# Patient Record
Sex: MALE | Race: WHITE | Employment: UNEMPLOYED | URBAN - METROPOLITAN AREA
[De-identification: names, ages, dates, MRNs, and addresses within clinical notes are randomized per-mention and may not be internally consistent; named-entity substitution may affect disease eponyms.]

---

## 2023-12-29 DIAGNOSIS — F33.42 RECURRENT MAJOR DEPRESSIVE DISORDER, IN FULL REMISSION (HCC): ICD-10-CM

## 2023-12-29 DIAGNOSIS — Z12.5 SCREENING FOR PROSTATE CANCER: ICD-10-CM

## 2023-12-29 DIAGNOSIS — R79.89 LOW TESTOSTERONE: ICD-10-CM

## 2023-12-29 DIAGNOSIS — I10 PRIMARY HYPERTENSION: ICD-10-CM

## 2023-12-29 DIAGNOSIS — Z13.220 ENCOUNTER FOR SCREENING FOR LIPOID DISORDERS: ICD-10-CM

## 2023-12-29 LAB
ABSOLUTE IMMATURE GRANULOCYTE: <0.03 K/UL (ref 0–0.58)
ALBUMIN SERPL-MCNC: 4.3 G/DL (ref 3.5–5.2)
ALP BLD-CCNC: 50 U/L (ref 40–129)
ALT SERPL-CCNC: 19 U/L (ref 0–40)
ANION GAP SERPL CALCULATED.3IONS-SCNC: 12 MMOL/L (ref 7–16)
AST SERPL-CCNC: 23 U/L (ref 0–39)
BASOPHILS ABSOLUTE: 0.07 K/UL (ref 0–0.2)
BASOPHILS RELATIVE PERCENT: 1 % (ref 0–2)
BILIRUB SERPL-MCNC: 0.9 MG/DL (ref 0–1.2)
BUN BLDV-MCNC: 22 MG/DL (ref 6–20)
CALCIUM SERPL-MCNC: 9.6 MG/DL (ref 8.6–10.2)
CHLORIDE BLD-SCNC: 102 MMOL/L (ref 98–107)
CHOLESTEROL, FASTING: 159 MG/DL
CO2: 23 MMOL/L (ref 22–29)
CREAT SERPL-MCNC: 1.2 MG/DL (ref 0.7–1.2)
EOSINOPHILS ABSOLUTE: 0.26 K/UL (ref 0.05–0.5)
EOSINOPHILS RELATIVE PERCENT: 4 % (ref 0–6)
GFR SERPL CREATININE-BSD FRML MDRD: >60 ML/MIN/1.73M2
GLUCOSE BLD-MCNC: 85 MG/DL (ref 74–99)
HCT VFR BLD CALC: 44.2 % (ref 37–54)
HDLC SERPL-MCNC: 43 MG/DL
HEMOGLOBIN: 14.8 G/DL (ref 12.5–16.5)
IMMATURE GRANULOCYTES: 0 % (ref 0–5)
LDL CHOLESTEROL: 98 MG/DL
LYMPHOCYTES ABSOLUTE: 2.8 K/UL (ref 1.5–4)
LYMPHOCYTES RELATIVE PERCENT: 46 % (ref 20–42)
MCH RBC QN AUTO: 34.3 PG (ref 26–35)
MCHC RBC AUTO-ENTMCNC: 33.5 G/DL (ref 32–34.5)
MCV RBC AUTO: 102.3 FL (ref 80–99.9)
MONOCYTES ABSOLUTE: 0.53 K/UL (ref 0.1–0.95)
MONOCYTES RELATIVE PERCENT: 9 % (ref 2–12)
NEUTROPHILS ABSOLUTE: 2.37 K/UL (ref 1.8–7.3)
NEUTROPHILS RELATIVE PERCENT: 39 % (ref 43–80)
PDW BLD-RTO: 11.8 % (ref 11.5–15)
PLATELET # BLD: 363 K/UL (ref 130–450)
PMV BLD AUTO: 10.3 FL (ref 7–12)
POTASSIUM SERPL-SCNC: 4.7 MMOL/L (ref 3.5–5)
PROSTATE SPECIFIC ANTIGEN: 3.21 NG/ML (ref 0–4)
RBC # BLD: 4.32 M/UL (ref 3.8–5.8)
SODIUM BLD-SCNC: 137 MMOL/L (ref 132–146)
TOTAL PROTEIN: 7.1 G/DL (ref 6.4–8.3)
TRIGLYCERIDE, FASTING: 92 MG/DL
TSH SERPL DL<=0.05 MIU/L-ACNC: 1.96 UIU/ML (ref 0.27–4.2)
VLDLC SERPL CALC-MCNC: 18 MG/DL
WBC # BLD: 6.1 K/UL (ref 4.5–11.5)

## 2023-12-30 LAB
FOLLICLE STIMULATING HORMONE: 5.6 MIU/ML
LH: 6.4 MIU/ML

## 2024-01-03 LAB
SEX HORMONE BINDING GLOBULIN: 50 NMOL/L (ref 11–80)
TESTOSTERONE FREE-NONMALE: 66.5 PG/ML (ref 47–244)
TESTOSTERONE TOTAL: 426 NG/DL (ref 220–1000)
TESTOSTERONE, BIOAVAILABLE: 155.8 NG/DL (ref 130–680)

## 2024-01-04 ENCOUNTER — TELEPHONE (OUTPATIENT)
Dept: FAMILY MEDICINE CLINIC | Age: 59
End: 2024-01-04

## 2024-01-04 DIAGNOSIS — D75.89 MACROCYTOSIS: Primary | ICD-10-CM

## 2024-01-04 NOTE — TELEPHONE ENCOUNTER
----- Message from Tyree Ellison DO sent at 1/3/2024  4:26 PM EST -----  Labs normal other than macrocytosis    Needs B12 and folate levels Dx macrocytosis (anne up please and thanks)    If patient is drinking alcohol, stop, it is starting to manifest as derangements on lab work (doing bad things in the blood and to organs).

## 2024-01-12 DIAGNOSIS — D75.89 MACROCYTOSIS: ICD-10-CM

## 2024-01-12 LAB
FOLATE: 7.4 NG/ML (ref 4.8–24.2)
VITAMIN B-12: 455 PG/ML (ref 211–946)

## 2024-01-16 ENCOUNTER — TELEPHONE (OUTPATIENT)
Dept: FAMILY MEDICINE CLINIC | Age: 59
End: 2024-01-16

## 2024-01-16 DIAGNOSIS — R71.8 RED BLOOD CELL ABNORMALITY: Primary | ICD-10-CM

## 2024-01-16 DIAGNOSIS — D75.89 MACROCYTOSIS: ICD-10-CM

## 2024-01-16 NOTE — TELEPHONE ENCOUNTER
----- Message from Tyree Ellison DO sent at 1/15/2024 10:19 AM EST -----  Can we confirm if any current alcohol use?  The b12 and folate are normal.  If none, then we can have a pathologist look under the microscope and a reticulocyte count for other potential causes.   (Dx macrocytosis, red blood cell abnormality)

## 2024-01-16 NOTE — TELEPHONE ENCOUNTER
Patient notified   Per patient, no current alcohol use.    I called [facility], then transferred to pathology.   Spoke with Dr Marquez.   Per Dr Marquez order peripheral blood smear review and another CBC with Diff. A report will be sent with dictation by pathologist.     Reticulocytes pended  Peripheral Smear, CBC w/Diff pended if need instead

## 2024-03-07 ENCOUNTER — TELEPHONE (OUTPATIENT)
Dept: FAMILY MEDICINE CLINIC | Age: 59
End: 2024-03-07

## 2024-03-07 DIAGNOSIS — R62.7 FAILURE TO THRIVE IN ADULT: ICD-10-CM

## 2024-03-07 DIAGNOSIS — C16.9 ADENOCARCINOMA OF STOMACH (HCC): Primary | ICD-10-CM

## 2024-03-07 DIAGNOSIS — Z51.5 END OF LIFE CARE: ICD-10-CM

## 2024-03-07 NOTE — TELEPHONE ENCOUNTER
Opal-pt cousin called in to report that pt has rapidly declined in the last several weeks. Pt spends his time \"writhing in pain and drinking alcohol\". Pt has current diagnosis of adenocarcinoma of the stomach and is refusing any treatment, pt does not see oncology for condition.     Opal requesting an appt to be evaluated to order/adjust palliative care. Pt does not have Home Health visiting and is requesting end of life care or evaluation.     Soonest available appt is 07/2024.

## 2024-03-11 ENCOUNTER — HOSPITAL ENCOUNTER (INPATIENT)
Age: 59
LOS: 2 days | Discharge: HOME | DRG: 469 | End: 2024-03-13
Attending: EMERGENCY MEDICINE | Admitting: INTERNAL MEDICINE
Payer: MEDICAID

## 2024-03-11 ENCOUNTER — APPOINTMENT (OUTPATIENT)
Dept: GENERAL RADIOLOGY | Age: 59
DRG: 469 | End: 2024-03-11
Payer: MEDICAID

## 2024-03-11 ENCOUNTER — TELEPHONE (OUTPATIENT)
Dept: PALLATIVE CARE | Age: 59
End: 2024-03-11

## 2024-03-11 DIAGNOSIS — N17.9 AKI (ACUTE KIDNEY INJURY) (HCC): ICD-10-CM

## 2024-03-11 DIAGNOSIS — F10.10 ALCOHOL ABUSE: ICD-10-CM

## 2024-03-11 DIAGNOSIS — R55 SYNCOPE AND COLLAPSE: Primary | ICD-10-CM

## 2024-03-11 LAB
ALBUMIN SERPL-MCNC: 4.1 G/DL (ref 3.5–5.2)
ALP SERPL-CCNC: 74 U/L (ref 40–129)
ALT SERPL-CCNC: 79 U/L (ref 0–40)
ANION GAP SERPL CALCULATED.3IONS-SCNC: 18 MMOL/L (ref 7–16)
AST SERPL-CCNC: 80 U/L (ref 0–39)
BASOPHILS # BLD: 0.03 K/UL (ref 0–0.2)
BASOPHILS NFR BLD: 0 % (ref 0–2)
BILIRUB SERPL-MCNC: 0.3 MG/DL (ref 0–1.2)
BUN SERPL-MCNC: 49 MG/DL (ref 6–20)
CALCIUM SERPL-MCNC: 8.5 MG/DL (ref 8.6–10.2)
CHLORIDE SERPL-SCNC: 104 MMOL/L (ref 98–107)
CO2 SERPL-SCNC: 20 MMOL/L (ref 22–29)
CREAT SERPL-MCNC: 2 MG/DL (ref 0.7–1.2)
EOSINOPHIL # BLD: 0.1 K/UL (ref 0.05–0.5)
EOSINOPHILS RELATIVE PERCENT: 1 % (ref 0–6)
ERYTHROCYTE [DISTWIDTH] IN BLOOD BY AUTOMATED COUNT: 16.3 % (ref 11.5–15)
GFR SERPL CREATININE-BSD FRML MDRD: 38 ML/MIN/1.73M2
GLUCOSE SERPL-MCNC: 107 MG/DL (ref 74–99)
HCT VFR BLD AUTO: 38.3 % (ref 37–54)
HGB BLD-MCNC: 13.1 G/DL (ref 12.5–16.5)
IMM GRANULOCYTES # BLD AUTO: 0.05 K/UL (ref 0–0.58)
IMM GRANULOCYTES NFR BLD: 1 % (ref 0–5)
INR PPP: 0.9
LACTATE BLDV-SCNC: 2.6 MMOL/L (ref 0.5–2.2)
LIPASE SERPL-CCNC: 126 U/L (ref 13–60)
LYMPHOCYTES NFR BLD: 1.63 K/UL (ref 1.5–4)
LYMPHOCYTES RELATIVE PERCENT: 22 % (ref 20–42)
MAGNESIUM SERPL-MCNC: 2.1 MG/DL (ref 1.6–2.6)
MCH RBC QN AUTO: 34 PG (ref 26–35)
MCHC RBC AUTO-ENTMCNC: 34.2 G/DL (ref 32–34.5)
MCV RBC AUTO: 99.5 FL (ref 80–99.9)
MONOCYTES NFR BLD: 0.73 K/UL (ref 0.1–0.95)
MONOCYTES NFR BLD: 10 % (ref 2–12)
NEUTROPHILS NFR BLD: 66 % (ref 43–80)
NEUTS SEG NFR BLD: 4.97 K/UL (ref 1.8–7.3)
PLATELET # BLD AUTO: 209 K/UL (ref 130–450)
PMV BLD AUTO: 9.2 FL (ref 7–12)
POTASSIUM SERPL-SCNC: 3.6 MMOL/L (ref 3.5–5)
PROT SERPL-MCNC: 6.5 G/DL (ref 6.4–8.3)
PROTHROMBIN TIME: 10 SEC (ref 9.3–12.4)
RBC # BLD AUTO: 3.85 M/UL (ref 3.8–5.8)
SODIUM SERPL-SCNC: 142 MMOL/L (ref 132–146)
TROPONIN I SERPL HS-MCNC: 12 NG/L (ref 0–11)
WBC OTHER # BLD: 7.5 K/UL (ref 4.5–11.5)

## 2024-03-11 PROCEDURE — 85025 COMPLETE CBC W/AUTO DIFF WBC: CPT

## 2024-03-11 PROCEDURE — 71045 X-RAY EXAM CHEST 1 VIEW: CPT

## 2024-03-11 PROCEDURE — 85610 PROTHROMBIN TIME: CPT

## 2024-03-11 PROCEDURE — 83690 ASSAY OF LIPASE: CPT

## 2024-03-11 PROCEDURE — 84484 ASSAY OF TROPONIN QUANT: CPT

## 2024-03-11 PROCEDURE — 83605 ASSAY OF LACTIC ACID: CPT

## 2024-03-11 PROCEDURE — 80053 COMPREHEN METABOLIC PANEL: CPT

## 2024-03-11 PROCEDURE — 6370000000 HC RX 637 (ALT 250 FOR IP)

## 2024-03-11 PROCEDURE — 96360 HYDRATION IV INFUSION INIT: CPT

## 2024-03-11 PROCEDURE — 1200000000 HC SEMI PRIVATE

## 2024-03-11 PROCEDURE — 93005 ELECTROCARDIOGRAM TRACING: CPT

## 2024-03-11 PROCEDURE — 99285 EMERGENCY DEPT VISIT HI MDM: CPT

## 2024-03-11 PROCEDURE — 83735 ASSAY OF MAGNESIUM: CPT

## 2024-03-11 PROCEDURE — 2580000003 HC RX 258

## 2024-03-11 PROCEDURE — 96361 HYDRATE IV INFUSION ADD-ON: CPT

## 2024-03-11 RX ORDER — ENOXAPARIN SODIUM 100 MG/ML
40 INJECTION SUBCUTANEOUS DAILY
Status: DISCONTINUED | OUTPATIENT
Start: 2024-03-12 | End: 2024-03-13 | Stop reason: HOSPADM

## 2024-03-11 RX ORDER — GLUCAGON 1 MG/ML
1 KIT INJECTION PRN
Status: DISCONTINUED | OUTPATIENT
Start: 2024-03-11 | End: 2024-03-12

## 2024-03-11 RX ORDER — LORAZEPAM 1 MG/1
3 TABLET ORAL
Status: DISCONTINUED | OUTPATIENT
Start: 2024-03-11 | End: 2024-03-11

## 2024-03-11 RX ORDER — SODIUM CHLORIDE 0.9 % (FLUSH) 0.9 %
5-40 SYRINGE (ML) INJECTION PRN
Status: DISCONTINUED | OUTPATIENT
Start: 2024-03-11 | End: 2024-03-13 | Stop reason: HOSPADM

## 2024-03-11 RX ORDER — LORAZEPAM 1 MG/1
1 TABLET ORAL
Status: DISCONTINUED | OUTPATIENT
Start: 2024-03-11 | End: 2024-03-11

## 2024-03-11 RX ORDER — POLYETHYLENE GLYCOL 3350 17 G/17G
17 POWDER, FOR SOLUTION ORAL DAILY PRN
Status: DISCONTINUED | OUTPATIENT
Start: 2024-03-11 | End: 2024-03-12

## 2024-03-11 RX ORDER — SODIUM CHLORIDE 9 MG/ML
INJECTION, SOLUTION INTRAVENOUS PRN
Status: DISCONTINUED | OUTPATIENT
Start: 2024-03-11 | End: 2024-03-11 | Stop reason: SDUPTHER

## 2024-03-11 RX ORDER — LORAZEPAM 1 MG/1
4 TABLET ORAL
Status: DISCONTINUED | OUTPATIENT
Start: 2024-03-11 | End: 2024-03-11

## 2024-03-11 RX ORDER — ACETAMINOPHEN 650 MG/1
650 SUPPOSITORY RECTAL EVERY 6 HOURS PRN
Status: DISCONTINUED | OUTPATIENT
Start: 2024-03-11 | End: 2024-03-13 | Stop reason: HOSPADM

## 2024-03-11 RX ORDER — M-VIT,TX,IRON,MINS/CALC/FOLIC 27MG-0.4MG
1 TABLET ORAL DAILY
Status: DISCONTINUED | OUTPATIENT
Start: 2024-03-11 | End: 2024-03-13 | Stop reason: HOSPADM

## 2024-03-11 RX ORDER — LORAZEPAM 2 MG/ML
3 INJECTION INTRAMUSCULAR
Status: DISCONTINUED | OUTPATIENT
Start: 2024-03-11 | End: 2024-03-11

## 2024-03-11 RX ORDER — LORAZEPAM 2 MG/ML
2 INJECTION INTRAMUSCULAR
Status: DISCONTINUED | OUTPATIENT
Start: 2024-03-11 | End: 2024-03-11

## 2024-03-11 RX ORDER — POTASSIUM CHLORIDE 7.45 MG/ML
10 INJECTION INTRAVENOUS PRN
Status: DISCONTINUED | OUTPATIENT
Start: 2024-03-11 | End: 2024-03-13 | Stop reason: HOSPADM

## 2024-03-11 RX ORDER — POTASSIUM CHLORIDE 20 MEQ/1
40 TABLET, EXTENDED RELEASE ORAL PRN
Status: DISCONTINUED | OUTPATIENT
Start: 2024-03-11 | End: 2024-03-13 | Stop reason: HOSPADM

## 2024-03-11 RX ORDER — 0.9 % SODIUM CHLORIDE 0.9 %
1000 INTRAVENOUS SOLUTION INTRAVENOUS ONCE
Status: COMPLETED | OUTPATIENT
Start: 2024-03-11 | End: 2024-03-11

## 2024-03-11 RX ORDER — 0.9 % SODIUM CHLORIDE 0.9 %
1000 INTRAVENOUS SOLUTION INTRAVENOUS ONCE
Status: COMPLETED | OUTPATIENT
Start: 2024-03-11 | End: 2024-03-12

## 2024-03-11 RX ORDER — ACETAMINOPHEN 325 MG/1
650 TABLET ORAL EVERY 6 HOURS PRN
Status: DISCONTINUED | OUTPATIENT
Start: 2024-03-11 | End: 2024-03-13 | Stop reason: HOSPADM

## 2024-03-11 RX ORDER — MAGNESIUM SULFATE IN WATER 40 MG/ML
2000 INJECTION, SOLUTION INTRAVENOUS PRN
Status: DISCONTINUED | OUTPATIENT
Start: 2024-03-11 | End: 2024-03-13 | Stop reason: HOSPADM

## 2024-03-11 RX ORDER — SODIUM CHLORIDE 0.9 % (FLUSH) 0.9 %
5-40 SYRINGE (ML) INJECTION EVERY 12 HOURS SCHEDULED
Status: DISCONTINUED | OUTPATIENT
Start: 2024-03-11 | End: 2024-03-13 | Stop reason: HOSPADM

## 2024-03-11 RX ORDER — GAUZE BANDAGE 2" X 2"
100 BANDAGE TOPICAL DAILY
Status: DISCONTINUED | OUTPATIENT
Start: 2024-03-11 | End: 2024-03-13 | Stop reason: HOSPADM

## 2024-03-11 RX ORDER — DEXTROSE MONOHYDRATE 100 MG/ML
INJECTION, SOLUTION INTRAVENOUS CONTINUOUS PRN
Status: DISCONTINUED | OUTPATIENT
Start: 2024-03-11 | End: 2024-03-12

## 2024-03-11 RX ORDER — SODIUM CHLORIDE 9 MG/ML
INJECTION, SOLUTION INTRAVENOUS PRN
Status: DISCONTINUED | OUTPATIENT
Start: 2024-03-11 | End: 2024-03-13 | Stop reason: HOSPADM

## 2024-03-11 RX ORDER — LORAZEPAM 2 MG/ML
4 INJECTION INTRAMUSCULAR
Status: DISCONTINUED | OUTPATIENT
Start: 2024-03-11 | End: 2024-03-11

## 2024-03-11 RX ORDER — FOLIC ACID 1 MG/1
1 TABLET ORAL DAILY
Status: DISCONTINUED | OUTPATIENT
Start: 2024-03-11 | End: 2024-03-13 | Stop reason: HOSPADM

## 2024-03-11 RX ORDER — LORAZEPAM 2 MG/ML
1 INJECTION INTRAMUSCULAR
Status: DISCONTINUED | OUTPATIENT
Start: 2024-03-11 | End: 2024-03-11

## 2024-03-11 RX ORDER — LORAZEPAM 1 MG/1
2 TABLET ORAL
Status: DISCONTINUED | OUTPATIENT
Start: 2024-03-11 | End: 2024-03-11

## 2024-03-11 RX ADMIN — Medication 1 TABLET: at 20:23

## 2024-03-11 RX ADMIN — SODIUM CHLORIDE 1000 ML: 9 INJECTION, SOLUTION INTRAVENOUS at 20:27

## 2024-03-11 RX ADMIN — Medication 100 MG: at 20:23

## 2024-03-11 RX ADMIN — FOLIC ACID 1 MG: 1 TABLET ORAL at 20:23

## 2024-03-11 ASSESSMENT — LIFESTYLE VARIABLES
HOW MANY STANDARD DRINKS CONTAINING ALCOHOL DO YOU HAVE ON A TYPICAL DAY: 1 OR 2
HOW OFTEN DO YOU HAVE A DRINK CONTAINING ALCOHOL: 2-4 TIMES A MONTH

## 2024-03-11 NOTE — TELEPHONE ENCOUNTER
Called to speak with Opal about referral for Palliative Care for Kirk. Opal appreciated call but does not need this referral any longer, Kirk does not want this service.

## 2024-03-11 NOTE — ED PROVIDER NOTES
Department of Emergency Medicine     Written by: Shen Estes DO  Patient Name: Jesus Briscoe  Admit Date: 3/11/2024  7:10 PM  MRN: [id number]                   : 1965      HPI  Chief Complaint   Patient presents with    Loss of Consciousness     2 min of loc, denies pain from falling, unknown if hit head, complaining of headache, pt is attempting to detox himself off alcohol. 70/30 BP for ems on scene     This is a 58-year-old male with past medical history of hypertension and alcohol abuse who presents to the emergency department today with a syncopal episode.  [place] he states his head got \"fuzzy\" and he passed out.  He states he has full recollection of the entire event and that his \"body passed out but not his brain\".  Patient is currently attempting to detox himself off of alcohol.  Today, he was sipping on bourbon while this event happened.  He states he feels at his baseline at this time.  Per EMS, when they arrived on scene, patient's blood pressure 70/30.  Patient states he only has high blood pressure and has never had blood pressure that low before.  Patient denies any lightheadedness or dizziness, fever or chills, nausea or vomiting, chest pain, shortness of breath, abdominal pain, hematuria or dysuria, constipation or diarrhea.    Nursing notes were all reviewed and agreed with or any disagreements were addressed in the HPI.    Review of systems:    Pertinent positives and negatives mentioned in the HPI/MDM.     Physical Exam  Vitals and nursing note reviewed.   Constitutional:       General: He is not in acute distress.  HENT:      Head: Normocephalic and atraumatic.   Eyes:      Extraocular Movements: Extraocular movements intact.      Pupils: Pupils are equal, round, and reactive to light.   Cardiovascular:      Rate and Rhythm: Normal rate and regular rhythm.   Pulmonary:      Effort: Pulmonary effort is normal.      Breath sounds: Normal

## 2024-03-12 ENCOUNTER — APPOINTMENT (OUTPATIENT)
Dept: CT IMAGING | Age: 59
DRG: 469 | End: 2024-03-12
Payer: MEDICAID

## 2024-03-12 ENCOUNTER — APPOINTMENT (OUTPATIENT)
Age: 59
DRG: 469 | End: 2024-03-12
Attending: INTERNAL MEDICINE
Payer: MEDICAID

## 2024-03-12 LAB
ABO + RH BLD: NORMAL
ALBUMIN SERPL-MCNC: 3.8 G/DL (ref 3.5–5.2)
ALP SERPL-CCNC: 61 U/L (ref 40–129)
ALT SERPL-CCNC: 83 U/L (ref 0–40)
ANION GAP SERPL CALCULATED.3IONS-SCNC: 12 MMOL/L (ref 7–16)
ARM BAND NUMBER: NORMAL
AST SERPL-CCNC: 93 U/L (ref 0–39)
BASOPHILS # BLD: 0.04 K/UL (ref 0–0.2)
BASOPHILS NFR BLD: 1 % (ref 0–2)
BILIRUB SERPL-MCNC: 0.6 MG/DL (ref 0–1.2)
BILIRUB UR QL STRIP: NEGATIVE
BLOOD BANK SAMPLE EXPIRATION: NORMAL
BLOOD GROUP ANTIBODIES SERPL: NEGATIVE
BNP SERPL-MCNC: 62 PG/ML (ref 0–450)
BUN SERPL-MCNC: 37 MG/DL (ref 6–20)
CALCIUM SERPL-MCNC: 8.6 MG/DL (ref 8.6–10.2)
CHLORIDE SERPL-SCNC: 109 MMOL/L (ref 98–107)
CK SERPL-CCNC: 81 U/L (ref 20–200)
CLARITY UR: CLEAR
CO2 SERPL-SCNC: 20 MMOL/L (ref 22–29)
COLOR UR: YELLOW
CREAT SERPL-MCNC: 1 MG/DL (ref 0.7–1.2)
ECHO AV AREA PEAK VELOCITY: 3 CM2
ECHO AV AREA PEAK VELOCITY: 3.1 CM2
ECHO AV AREA PEAK VELOCITY: 3.2 CM2
ECHO AV AREA PEAK VELOCITY: 3.4 CM2
ECHO AV AREA VTI: 3.2 CM2
ECHO AV AREA/BSA VTI: 1.5 CM2/M2
ECHO AV CUSP MM: 2.3 CM
ECHO AV MEAN GRADIENT: 6 MMHG
ECHO AV MEAN VELOCITY: 1.2 M/S
ECHO AV PEAK GRADIENT: 10 MMHG
ECHO AV PEAK GRADIENT: 9 MMHG
ECHO AV PEAK VELOCITY: 1.5 M/S
ECHO AV PEAK VELOCITY: 1.6 M/S
ECHO AV VTI: 28.6 CM
ECHO BSA: 2.16 M2
ECHO EST RA PRESSURE: 3 MMHG
ECHO LA DIAMETER INDEX: 1.75 CM/M2
ECHO LA DIAMETER: 3.8 CM
ECHO LA VOL A-L A2C: 60 ML (ref 18–58)
ECHO LA VOL A-L A4C: 53 ML (ref 18–58)
ECHO LA VOL MOD A2C: 58 ML (ref 18–58)
ECHO LA VOL MOD A4C: 51 ML (ref 18–58)
ECHO LA VOLUME AREA LENGTH: 61 ML
ECHO LA VOLUME INDEX A-L A2C: 28 ML/M2 (ref 16–34)
ECHO LA VOLUME INDEX A-L A4C: 24 ML/M2 (ref 16–34)
ECHO LA VOLUME INDEX AREA LENGTH: 28 ML/M2 (ref 16–34)
ECHO LA VOLUME INDEX MOD A2C: 27 ML/M2 (ref 16–34)
ECHO LA VOLUME INDEX MOD A4C: 24 ML/M2 (ref 16–34)
ECHO LV EF PHYSICIAN: 60 %
ECHO LV FRACTIONAL SHORTENING: 36 % (ref 28–44)
ECHO LV INTERNAL DIMENSION DIASTOLE INDEX: 2.03 CM/M2
ECHO LV INTERNAL DIMENSION DIASTOLIC: 4.4 CM (ref 4.2–5.9)
ECHO LV INTERNAL DIMENSION SYSTOLIC INDEX: 1.29 CM/M2
ECHO LV INTERNAL DIMENSION SYSTOLIC: 2.8 CM
ECHO LV IVSD: 1.2 CM (ref 0.6–1)
ECHO LV IVSS: 1.3 CM
ECHO LV MASS 2D: 191.3 G (ref 88–224)
ECHO LV MASS INDEX 2D: 88.2 G/M2 (ref 49–115)
ECHO LV POSTERIOR WALL DIASTOLIC: 1.2 CM (ref 0.6–1)
ECHO LV POSTERIOR WALL SYSTOLIC: 1.5 CM
ECHO LV RELATIVE WALL THICKNESS RATIO: 0.55
ECHO LVOT AREA: 3.8 CM2
ECHO LVOT AV VTI INDEX: 0.86
ECHO LVOT DIAM: 2.2 CM
ECHO LVOT MEAN GRADIENT: 4 MMHG
ECHO LVOT PEAK GRADIENT: 6 MMHG
ECHO LVOT PEAK GRADIENT: 8 MMHG
ECHO LVOT PEAK VELOCITY: 1.3 M/S
ECHO LVOT PEAK VELOCITY: 1.4 M/S
ECHO LVOT STROKE VOLUME INDEX: 42.9 ML/M2
ECHO LVOT SV: 93.1 ML
ECHO LVOT VTI: 24.5 CM
ECHO MV "A" WAVE DURATION: 152.3 MSEC
ECHO MV A VELOCITY: 0.71 M/S
ECHO MV AREA PHT: 3.3 CM2
ECHO MV AREA VTI: 3.8 CM2
ECHO MV E DECELERATION TIME (DT): 222.5 MS
ECHO MV E VELOCITY: 0.89 M/S
ECHO MV E/A RATIO: 1.25
ECHO MV LVOT VTI INDEX: 1.01
ECHO MV MAX VELOCITY: 0.9 M/S
ECHO MV MEAN GRADIENT: 1 MMHG
ECHO MV MEAN VELOCITY: 0.6 M/S
ECHO MV PEAK GRADIENT: 3 MMHG
ECHO MV PRESSURE HALF TIME (PHT): 65.7 MS
ECHO MV VTI: 24.8 CM
ECHO PULMONARY ARTERY END DIASTOLIC PRESSURE: 3 MMHG
ECHO PV MAX VELOCITY: 1.1 M/S
ECHO PV MEAN GRADIENT: 3 MMHG
ECHO PV MEAN VELOCITY: 0.7 M/S
ECHO PV PEAK GRADIENT: 5 MMHG
ECHO PV REGURGITANT MAX VELOCITY: 0.9 M/S
ECHO PV VTI: 21.5 CM
ECHO PVEIN A DURATION: 161.5 MS
ECHO PVEIN A VELOCITY: 0.3 M/S
ECHO PVEIN PEAK D VELOCITY: 0.5 M/S
ECHO PVEIN PEAK S VELOCITY: 0.6 M/S
ECHO PVEIN S/D RATIO: 1.2
ECHO RIGHT VENTRICULAR SYSTOLIC PRESSURE (RVSP): 24 MMHG
ECHO RV INTERNAL DIMENSION: 3.2 CM
ECHO RV TAPSE: 2.2 CM (ref 1.7–?)
ECHO RVOT PEAK GRADIENT: 2 MMHG
ECHO RVOT PEAK VELOCITY: 0.6 M/S
ECHO TV REGURGITANT MAX VELOCITY: 2.29 M/S
ECHO TV REGURGITANT PEAK GRADIENT: 21 MMHG
EKG ATRIAL RATE: 90 BPM
EKG P AXIS: 76 DEGREES
EKG P-R INTERVAL: 176 MS
EKG Q-T INTERVAL: 372 MS
EKG QRS DURATION: 84 MS
EKG QTC CALCULATION (BAZETT): 455 MS
EKG R AXIS: -47 DEGREES
EKG T AXIS: 73 DEGREES
EKG VENTRICULAR RATE: 90 BPM
EOSINOPHIL # BLD: 0.19 K/UL (ref 0.05–0.5)
EOSINOPHILS RELATIVE PERCENT: 3 % (ref 0–6)
ERYTHROCYTE [DISTWIDTH] IN BLOOD BY AUTOMATED COUNT: 16.6 % (ref 11.5–15)
FOLATE SERPL-MCNC: 17.8 NG/ML (ref 4.8–24.2)
GFR SERPL CREATININE-BSD FRML MDRD: >60 ML/MIN/1.73M2
GLUCOSE BLD-MCNC: 92 MG/DL (ref 74–99)
GLUCOSE SERPL-MCNC: 83 MG/DL (ref 74–99)
GLUCOSE UR STRIP-MCNC: NEGATIVE MG/DL
HCT VFR BLD AUTO: 34.7 % (ref 37–54)
HGB BLD-MCNC: 11.9 G/DL (ref 12.5–16.5)
HGB UR QL STRIP.AUTO: NEGATIVE
IMM GRANULOCYTES # BLD AUTO: 0.04 K/UL (ref 0–0.58)
IMM GRANULOCYTES NFR BLD: 1 % (ref 0–5)
KETONES UR STRIP-MCNC: NEGATIVE MG/DL
LACTATE BLDV-SCNC: 0.9 MMOL/L (ref 0.5–2.2)
LEUKOCYTE ESTERASE UR QL STRIP: NEGATIVE
LIPASE SERPL-CCNC: 89 U/L (ref 13–60)
LYMPHOCYTES NFR BLD: 2.79 K/UL (ref 1.5–4)
LYMPHOCYTES RELATIVE PERCENT: 42 % (ref 20–42)
MAGNESIUM SERPL-MCNC: 2.1 MG/DL (ref 1.6–2.6)
MCH RBC QN AUTO: 34.2 PG (ref 26–35)
MCHC RBC AUTO-ENTMCNC: 34.3 G/DL (ref 32–34.5)
MCV RBC AUTO: 99.7 FL (ref 80–99.9)
MONOCYTES NFR BLD: 0.6 K/UL (ref 0.1–0.95)
MONOCYTES NFR BLD: 9 % (ref 2–12)
NEUTROPHILS NFR BLD: 45 % (ref 43–80)
NEUTS SEG NFR BLD: 2.93 K/UL (ref 1.8–7.3)
NITRITE UR QL STRIP: NEGATIVE
PH UR STRIP: 5.5 [PH] (ref 5–9)
PHOSPHATE SERPL-MCNC: 4.5 MG/DL (ref 2.5–4.5)
PLATELET # BLD AUTO: 195 K/UL (ref 130–450)
PMV BLD AUTO: 9.7 FL (ref 7–12)
POTASSIUM SERPL-SCNC: 3.3 MMOL/L (ref 3.5–5)
PROCALCITONIN SERPL-MCNC: 0.2 NG/ML (ref 0–0.08)
PROT SERPL-MCNC: 6.1 G/DL (ref 6.4–8.3)
PROT UR STRIP-MCNC: NEGATIVE MG/DL
RBC # BLD AUTO: 3.48 M/UL (ref 3.8–5.8)
RBC #/AREA URNS HPF: NORMAL /HPF
SODIUM SERPL-SCNC: 141 MMOL/L (ref 132–146)
SP GR UR STRIP: 1.01 (ref 1–1.03)
T4 FREE SERPL-MCNC: 1.1 NG/DL (ref 0.9–1.7)
TSH SERPL DL<=0.05 MIU/L-ACNC: 1.11 UIU/ML (ref 0.27–4.2)
UROBILINOGEN UR STRIP-ACNC: 0.2 EU/DL (ref 0–1)
VIT B12 SERPL-MCNC: 406 PG/ML (ref 211–946)
WBC #/AREA URNS HPF: NORMAL /HPF
WBC OTHER # BLD: 6.6 K/UL (ref 4.5–11.5)

## 2024-03-12 PROCEDURE — 6360000002 HC RX W HCPCS: Performed by: NURSE PRACTITIONER

## 2024-03-12 PROCEDURE — 83605 ASSAY OF LACTIC ACID: CPT

## 2024-03-12 PROCEDURE — 71260 CT THORAX DX C+: CPT

## 2024-03-12 PROCEDURE — 2580000003 HC RX 258: Performed by: INTERNAL MEDICINE

## 2024-03-12 PROCEDURE — 82607 VITAMIN B-12: CPT

## 2024-03-12 PROCEDURE — 82746 ASSAY OF FOLIC ACID SERUM: CPT

## 2024-03-12 PROCEDURE — 84443 ASSAY THYROID STIM HORMONE: CPT

## 2024-03-12 PROCEDURE — 82550 ASSAY OF CK (CPK): CPT

## 2024-03-12 PROCEDURE — 93306 TTE W/DOPPLER COMPLETE: CPT

## 2024-03-12 PROCEDURE — 93306 TTE W/DOPPLER COMPLETE: CPT | Performed by: INTERNAL MEDICINE

## 2024-03-12 PROCEDURE — 82962 GLUCOSE BLOOD TEST: CPT

## 2024-03-12 PROCEDURE — 36415 COLL VENOUS BLD VENIPUNCTURE: CPT

## 2024-03-12 PROCEDURE — 93010 ELECTROCARDIOGRAM REPORT: CPT | Performed by: INTERNAL MEDICINE

## 2024-03-12 PROCEDURE — 6360000004 HC RX CONTRAST MEDICATION: Performed by: RADIOLOGY

## 2024-03-12 PROCEDURE — 84439 ASSAY OF FREE THYROXINE: CPT

## 2024-03-12 PROCEDURE — 80053 COMPREHEN METABOLIC PANEL: CPT

## 2024-03-12 PROCEDURE — 86901 BLOOD TYPING SEROLOGIC RH(D): CPT

## 2024-03-12 PROCEDURE — 86850 RBC ANTIBODY SCREEN: CPT

## 2024-03-12 PROCEDURE — 6370000000 HC RX 637 (ALT 250 FOR IP): Performed by: NURSE PRACTITIONER

## 2024-03-12 PROCEDURE — 1200000000 HC SEMI PRIVATE

## 2024-03-12 PROCEDURE — 81001 URINALYSIS AUTO W/SCOPE: CPT

## 2024-03-12 PROCEDURE — 83735 ASSAY OF MAGNESIUM: CPT

## 2024-03-12 PROCEDURE — 6370000000 HC RX 637 (ALT 250 FOR IP)

## 2024-03-12 PROCEDURE — 6370000000 HC RX 637 (ALT 250 FOR IP): Performed by: INTERNAL MEDICINE

## 2024-03-12 PROCEDURE — 84145 PROCALCITONIN (PCT): CPT

## 2024-03-12 PROCEDURE — C9113 INJ PANTOPRAZOLE SODIUM, VIA: HCPCS | Performed by: NURSE PRACTITIONER

## 2024-03-12 PROCEDURE — 83690 ASSAY OF LIPASE: CPT

## 2024-03-12 PROCEDURE — 84100 ASSAY OF PHOSPHORUS: CPT

## 2024-03-12 PROCEDURE — 74177 CT ABD & PELVIS W/CONTRAST: CPT

## 2024-03-12 PROCEDURE — 86900 BLOOD TYPING SEROLOGIC ABO: CPT

## 2024-03-12 PROCEDURE — 83880 ASSAY OF NATRIURETIC PEPTIDE: CPT

## 2024-03-12 PROCEDURE — 74176 CT ABD & PELVIS W/O CONTRAST: CPT

## 2024-03-12 PROCEDURE — 85025 COMPLETE CBC W/AUTO DIFF WBC: CPT

## 2024-03-12 PROCEDURE — 2580000003 HC RX 258

## 2024-03-12 PROCEDURE — 6360000002 HC RX W HCPCS: Performed by: INTERNAL MEDICINE

## 2024-03-12 RX ORDER — SENNOSIDES A AND B 8.6 MG/1
2 TABLET, FILM COATED ORAL NIGHTLY
Status: DISCONTINUED | OUTPATIENT
Start: 2024-03-12 | End: 2024-03-13 | Stop reason: HOSPADM

## 2024-03-12 RX ORDER — PANTOPRAZOLE SODIUM 40 MG/10ML
40 INJECTION, POWDER, LYOPHILIZED, FOR SOLUTION INTRAVENOUS 2 TIMES DAILY
Status: DISCONTINUED | OUTPATIENT
Start: 2024-03-12 | End: 2024-03-13 | Stop reason: HOSPADM

## 2024-03-12 RX ORDER — OMEPRAZOLE 20 MG/1
20 CAPSULE, DELAYED RELEASE ORAL DAILY
Status: ON HOLD | COMMUNITY
End: 2024-03-13 | Stop reason: HOSPADM

## 2024-03-12 RX ORDER — PANTOPRAZOLE SODIUM 40 MG/1
40 TABLET, DELAYED RELEASE ORAL ONCE
Status: DISCONTINUED | OUTPATIENT
Start: 2024-03-12 | End: 2024-03-12

## 2024-03-12 RX ORDER — SODIUM CHLORIDE 9 MG/ML
INJECTION, SOLUTION INTRAMUSCULAR; INTRAVENOUS; SUBCUTANEOUS
Status: DISPENSED
Start: 2024-03-12 | End: 2024-03-13

## 2024-03-12 RX ORDER — IBUPROFEN 800 MG/1
800 TABLET ORAL
Status: ON HOLD | COMMUNITY
End: 2024-03-13 | Stop reason: HOSPADM

## 2024-03-12 RX ORDER — DOCUSATE SODIUM 100 MG/1
200 CAPSULE, LIQUID FILLED ORAL 2 TIMES DAILY
Status: DISCONTINUED | OUTPATIENT
Start: 2024-03-12 | End: 2024-03-13 | Stop reason: HOSPADM

## 2024-03-12 RX ORDER — 0.9 % SODIUM CHLORIDE 0.9 %
1000 INTRAVENOUS SOLUTION INTRAVENOUS ONCE
Status: COMPLETED | OUTPATIENT
Start: 2024-03-12 | End: 2024-03-12

## 2024-03-12 RX ORDER — SUCRALFATE 1 G/1
1 TABLET ORAL EVERY 6 HOURS SCHEDULED
Status: DISCONTINUED | OUTPATIENT
Start: 2024-03-12 | End: 2024-03-13 | Stop reason: HOSPADM

## 2024-03-12 RX ORDER — POLYETHYLENE GLYCOL 3350 17 G/17G
17 POWDER, FOR SOLUTION ORAL DAILY
Status: DISCONTINUED | OUTPATIENT
Start: 2024-03-12 | End: 2024-03-13 | Stop reason: HOSPADM

## 2024-03-12 RX ORDER — PHENOL 1.4 %
10 AEROSOL, SPRAY (ML) MUCOUS MEMBRANE NIGHTLY PRN
COMMUNITY

## 2024-03-12 RX ORDER — LORAZEPAM 2 MG/ML
1 INJECTION INTRAMUSCULAR EVERY 4 HOURS PRN
Status: COMPLETED | OUTPATIENT
Start: 2024-03-12 | End: 2024-03-12

## 2024-03-12 RX ADMIN — POTASSIUM CHLORIDE 40 MEQ: 1500 TABLET, EXTENDED RELEASE ORAL at 08:25

## 2024-03-12 RX ADMIN — SODIUM CHLORIDE 1000 ML: 9 INJECTION, SOLUTION INTRAVENOUS at 05:54

## 2024-03-12 RX ADMIN — SODIUM CHLORIDE 1000 ML: 9 INJECTION, SOLUTION INTRAVENOUS at 01:29

## 2024-03-12 RX ADMIN — SUCRALFATE 1 G: 1 TABLET ORAL at 18:05

## 2024-03-12 RX ADMIN — PANTOPRAZOLE SODIUM 40 MG: 40 INJECTION, POWDER, FOR SOLUTION INTRAVENOUS at 11:21

## 2024-03-12 RX ADMIN — IOPAMIDOL 75 ML: 755 INJECTION, SOLUTION INTRAVENOUS at 10:04

## 2024-03-12 RX ADMIN — LORAZEPAM 1 MG: 2 INJECTION INTRAMUSCULAR; INTRAVENOUS at 21:35

## 2024-03-12 RX ADMIN — LORAZEPAM 1 MG: 2 INJECTION INTRAMUSCULAR; INTRAVENOUS at 11:26

## 2024-03-12 RX ADMIN — LORAZEPAM 1 MG: 2 INJECTION INTRAMUSCULAR; INTRAVENOUS at 01:26

## 2024-03-12 RX ADMIN — Medication 100 MG: at 08:28

## 2024-03-12 RX ADMIN — SUCRALFATE 1 G: 1 TABLET ORAL at 11:25

## 2024-03-12 RX ADMIN — SODIUM CHLORIDE, PRESERVATIVE FREE 10 ML: 5 INJECTION INTRAVENOUS at 01:30

## 2024-03-12 RX ADMIN — Medication 10 ML: at 22:05

## 2024-03-12 RX ADMIN — ENOXAPARIN SODIUM 40 MG: 100 INJECTION SUBCUTANEOUS at 08:31

## 2024-03-12 RX ADMIN — FOLIC ACID 1 MG: 1 TABLET ORAL at 08:25

## 2024-03-12 RX ADMIN — DOCUSATE SODIUM 200 MG: 100 CAPSULE, LIQUID FILLED ORAL at 21:29

## 2024-03-12 RX ADMIN — Medication 1 TABLET: at 08:24

## 2024-03-12 RX ADMIN — PANTOPRAZOLE SODIUM 40 MG: 40 INJECTION, POWDER, FOR SOLUTION INTRAVENOUS at 21:23

## 2024-03-12 RX ADMIN — SUCRALFATE 1 G: 1 TABLET ORAL at 23:39

## 2024-03-12 RX ADMIN — SENNOSIDES 17.2 MG: 8.6 TABLET, FILM COATED ORAL at 21:29

## 2024-03-12 RX ADMIN — LORAZEPAM 1 MG: 2 INJECTION INTRAMUSCULAR; INTRAVENOUS at 05:54

## 2024-03-12 ASSESSMENT — PAIN SCALES - GENERAL
PAINLEVEL_OUTOF10: 3
PAINLEVEL_OUTOF10: 2

## 2024-03-12 ASSESSMENT — PAIN DESCRIPTION - LOCATION
LOCATION: BACK
LOCATION: ABDOMEN

## 2024-03-12 ASSESSMENT — PAIN DESCRIPTION - ORIENTATION
ORIENTATION: LOWER
ORIENTATION: MID

## 2024-03-12 ASSESSMENT — PAIN DESCRIPTION - ONSET: ONSET: ON-GOING

## 2024-03-12 ASSESSMENT — PAIN DESCRIPTION - FREQUENCY: FREQUENCY: CONTINUOUS

## 2024-03-12 ASSESSMENT — PAIN DESCRIPTION - DESCRIPTORS
DESCRIPTORS: ACHING;SHARP
DESCRIPTORS: DULL;ACHING

## 2024-03-12 ASSESSMENT — PAIN DESCRIPTION - PAIN TYPE: TYPE: ACUTE PAIN

## 2024-03-12 NOTE — H&P
Elieser Etienne MD   Melatonin 10 MG TABS Take 10 mg by mouth nightly as needed   Yes Elieser Etienne MD   lisinopril (PRINIVIL;ZESTRIL) 10 MG tablet Take 1 tablet by mouth daily    Elieser Etienne MD   fluvoxaMINE (LUVOX) 50 MG tablet Take 1 tablet by mouth nightly  Patient not taking: Reported on 3/12/2024    Elieser Etienne MD   zoster recombinant adjuvanted vaccine (SHINGRIX) 50 MCG/0.5ML SUSR injection Inject 0.5 mLs into the muscle See Admin Instructions 1 dose now and repeat in 2-6 months  Patient not taking: Reported on 3/12/2024 12/18/23 6/15/24  Tyree Ellison DO   cloNIDine (CATAPRES) 0.1 MG tablet Take 1 tablet by mouth 2 times daily  Patient not taking: Reported on 3/12/2024 11/13/23   Elieser Etienne MD   fluticasone (FLONASE) 50 MCG/ACT nasal spray 2 sprays by Each Nostril route daily  Patient not taking: Reported on 3/12/2024 12/18/23   Tyree Ellison DO   cetirizine (ZYRTEC) 10 MG tablet Take 1 tablet by mouth daily  Patient not taking: Reported on 3/12/2024 12/18/23   Tyree Ellison DO       ALLERGIES:  Patient has no known allergies.    SOCIAL Hx:  Social History     Socioeconomic History    Marital status: Legally      Spouse name: Not on file    Number of children: Not on file    Years of education: Not on file    Highest education level: Not on file   Occupational History    Not on file   Tobacco Use    Smoking status: Former     Current packs/day: 0.00     Average packs/day: 0.5 packs/day for 38.8 years (19.4 ttl pk-yrs)     Types: Cigarettes     Start date:      Quit date: 2023     Years since quittin.3    Smokeless tobacco: Never   Substance and Sexual Activity    Alcohol use: Not Currently    Drug use: Never    Sexual activity: Not on file   Other Topics Concern    Not on file   Social History Narrative    Not on file     Social Determinants of Health     Financial Resource Strain: Low Risk  (2023)    Overall Financial

## 2024-03-12 NOTE — CARE COORDINATION
for transportation at discharge:      Financial    Pay[place]: [employer] [place] / Plan: [employer] [place] / Product Type: *No Product type* /     Does insurance require precert for SNF:     Potential assistance Purchasing Medications: No  Meds-to-Beds request: Yes      [id number] - [place], [place] - [address] - P [phone] - F [phone]  [address]  [place] [address]  Phone: [phone] Fax: [phone]      Notes:    Ss note:3/12/2024 1:16 PM consult noted for consideration for rehab. Met with pt, he is pleasant, reports he resides with a cousin Opal and her spouse. PTA pt independent and drives, relays he is not employed. PCP is Dr. Tyree Ellison. No hx of HHC/SNF/DME., has [employer] Medicaid.Pt relays a hx of inpt alcohol rehab at [facility] in [place] in November and relays he completed the program. SW offered referral to [facility], pt declines at this time. Plan at this time is to return home.  EVARISTO Cárdenas    The Plan for Transition of Care is related to the following treatment goals of Syncope and collapse [R55]  Alcohol abuse [F10.10]  JENNA (acute kidney injury) (HCC) [N17.9]

## 2024-03-12 NOTE — ACP (ADVANCE CARE PLANNING)
Advance Care Planning         The patient has appointed the following active healthcare agents:    Primary Decision Maker: Opal Roca - [facility] - [phone]    The Patient has the following current code status:    Code Status: Full Code

## 2024-03-12 NOTE — PLAN OF CARE
Problem: Safety - Adult  Goal: Free from fall injury  Outcome: Progressing     Problem: Neurosensory - Adult  Goal: Absence of seizures  Outcome: Progressing     Problem: Metabolic/Fluid and Electrolytes - Adult  Goal: Hemodynamic stability and optimal renal function maintained  Outcome: Progressing

## 2024-03-12 NOTE — PLAN OF CARE
Problem: Pain  Goal: Verbalizes/displays adequate comfort level or baseline comfort level  Outcome: Progressing     Problem: Safety - Adult  Goal: Free from fall injury  3/12/2024 1148 by Jojo Napoles, RN  Outcome: Progressing  3/12/2024 0401 by Areli Larry RN  Outcome: Progressing     Problem: ABCDS Injury Assessment  Goal: Absence of physical injury  Outcome: Progressing     Problem: Neurosensory - Adult  Goal: Absence of seizures  3/12/2024 1148 by Jojo Napoles, RN  Outcome: Progressing  3/12/2024 0401 by Areli Larry RN  Outcome: Progressing     Problem: Metabolic/Fluid and Electrolytes - Adult  Goal: Hemodynamic stability and optimal renal function maintained  3/12/2024 1148 by Jojo Napoles, RN  Outcome: Progressing  3/12/2024 0401 by Areli Larry RN  Outcome: Progressing

## 2024-03-13 ENCOUNTER — TELEPHONE (OUTPATIENT)
Dept: FAMILY MEDICINE CLINIC | Age: 59
End: 2024-03-13

## 2024-03-13 VITALS
TEMPERATURE: 97.8 F | HEART RATE: 64 BPM | OXYGEN SATURATION: 99 % | BODY MASS INDEX: 22.27 KG/M2 | SYSTOLIC BLOOD PRESSURE: 144 MMHG | HEIGHT: 75 IN | DIASTOLIC BLOOD PRESSURE: 93 MMHG | RESPIRATION RATE: 16 BRPM | WEIGHT: 179.1 LBS

## 2024-03-13 PROCEDURE — 6360000002 HC RX W HCPCS: Performed by: NURSE PRACTITIONER

## 2024-03-13 PROCEDURE — 6370000000 HC RX 637 (ALT 250 FOR IP)

## 2024-03-13 PROCEDURE — C9113 INJ PANTOPRAZOLE SODIUM, VIA: HCPCS | Performed by: NURSE PRACTITIONER

## 2024-03-13 PROCEDURE — 6360000002 HC RX W HCPCS: Performed by: INTERNAL MEDICINE

## 2024-03-13 PROCEDURE — 6370000000 HC RX 637 (ALT 250 FOR IP): Performed by: NURSE PRACTITIONER

## 2024-03-13 PROCEDURE — 2580000003 HC RX 258: Performed by: INTERNAL MEDICINE

## 2024-03-13 RX ORDER — SENNOSIDES A AND B 8.6 MG/1
2 TABLET, FILM COATED ORAL NIGHTLY PRN
Qty: 60 TABLET | Refills: 0 | COMMUNITY
Start: 2024-03-13 | End: 2024-04-12

## 2024-03-13 RX ORDER — SUCRALFATE 1 G/1
1 TABLET ORAL 4 TIMES DAILY
Qty: 120 TABLET | Refills: 0 | Status: SHIPPED | OUTPATIENT
Start: 2024-03-13

## 2024-03-13 RX ORDER — THIAMINE MONONITRATE (VIT B1) 100 MG
100 TABLET ORAL DAILY
Refills: 0 | COMMUNITY
Start: 2024-03-13

## 2024-03-13 RX ORDER — FOLIC ACID 1 MG/1
1 TABLET ORAL DAILY
Qty: 30 TABLET | Refills: 0 | Status: SHIPPED | OUTPATIENT
Start: 2024-03-13

## 2024-03-13 RX ORDER — PSEUDOEPHEDRINE HCL 30 MG
200 TABLET ORAL 2 TIMES DAILY PRN
COMMUNITY
Start: 2024-03-13

## 2024-03-13 RX ORDER — PANTOPRAZOLE SODIUM 40 MG/1
40 TABLET, DELAYED RELEASE ORAL 2 TIMES DAILY WITH MEALS
Qty: 60 TABLET | Refills: 1 | Status: SHIPPED | OUTPATIENT
Start: 2024-03-13

## 2024-03-13 RX ORDER — M-VIT,TX,IRON,MINS/CALC/FOLIC 27MG-0.4MG
1 TABLET ORAL DAILY
Qty: 30 TABLET | Refills: 0
Start: 2024-03-13

## 2024-03-13 RX ORDER — POLYETHYLENE GLYCOL 3350 17 G/17G
17 POWDER, FOR SOLUTION ORAL DAILY
Qty: 30 PACKET | Refills: 0 | COMMUNITY
Start: 2024-03-13 | End: 2024-04-12

## 2024-03-13 RX ADMIN — Medication 10 ML: at 11:44

## 2024-03-13 RX ADMIN — Medication 100 MG: at 11:47

## 2024-03-13 RX ADMIN — DOCUSATE SODIUM 200 MG: 100 CAPSULE, LIQUID FILLED ORAL at 11:42

## 2024-03-13 RX ADMIN — ENOXAPARIN SODIUM 40 MG: 100 INJECTION SUBCUTANEOUS at 11:43

## 2024-03-13 RX ADMIN — SUCRALFATE 1 G: 1 TABLET ORAL at 06:07

## 2024-03-13 RX ADMIN — PANTOPRAZOLE SODIUM 40 MG: 40 INJECTION, POWDER, FOR SOLUTION INTRAVENOUS at 11:43

## 2024-03-13 RX ADMIN — POLYETHYLENE GLYCOL 3350 17 G: 17 POWDER, FOR SOLUTION ORAL at 11:43

## 2024-03-13 RX ADMIN — Medication 1 TABLET: at 11:42

## 2024-03-13 RX ADMIN — FOLIC ACID 1 MG: 1 TABLET ORAL at 11:42

## 2024-03-13 ASSESSMENT — PAIN SCALES - GENERAL: PAINLEVEL_OUTOF10: 0

## 2024-03-13 NOTE — DISCHARGE SUMMARY
follow-up with the consults listed above as directed by them.  he is instructed to resume home medications and take new medications as indicated in the list above.  If the patient has a recurrence of symptoms, he is instructed to go to the ED.    Preparing for this patient's discharge, including paperwork, orders, instructions, and meeting with patient did require > 40 minutes.    AMBER Archibald CNP     3/13/2024  8:27 AM

## 2024-03-13 NOTE — PLAN OF CARE
Problem: Pain  Goal: Verbalizes/displays adequate comfort level or baseline comfort level  Outcome: Completed     Problem: Safety - Adult  Goal: Free from fall injury  Outcome: Completed     Problem: ABCDS Injury Assessment  Goal: Absence of physical injury  Outcome: Completed     Problem: Neurosensory - Adult  Goal: Absence of seizures  Outcome: Completed     Problem: Metabolic/Fluid and Electrolytes - Adult  Goal: Hemodynamic stability and optimal renal function maintained  Outcome: Completed

## 2024-03-13 NOTE — PROGRESS NOTES
Pharmacy Medication Reconciliation    The patient was interviewed regarding current PTA medication list, use and drug allergies. Patient was by himself. The patient was questioned regarding use of any other inhalers, topical products, over the counter medications, herbal medications, vitamin products or ophthalmic/nasal/otic medication use.      Allergy Update: Patient has no known allergies.    Recommendations/Findings/Discrepancies:   The following amendments were made to the patient's active medication list on file:   1) Additions: 0    2) Deletions: Clonidine, Cetirizine, fluticasone, and fluvoxamine  Removed shingrix per patient choice    3) Changes: 0    Total number of discrepancies: 5    Source/s of information: patient and [employer]    Thank you,    Eduarda Arellano, PharmD.  3/12/2024 2:11 PM    SJW: [phone]    
4 Eyes Skin Assessment     NAME:  Jesus Briscoe  YOB: 1965  MEDICAL RECORD NUMBER:  [id number]    The patient is being assessed for  Admission    I agree that at least one RN has performed a thorough Head to Toe Skin Assessment on the patient. ALL assessment sites listed below have been assessed.      Areas assessed by both nurses:    Head, Face, Ears, Shoulders, Back, Chest, Arms, Elbows, Hands, Sacrum. Buttock, Coccyx, Ischium, Legs. Feet and Heels, and Under Medical Devices         Does the Patient have a Wound? No noted wound(s)       Poncho Prevention initiated by RN: No  Wound Care Orders initiated by RN: No    Pressure Injury (Stage 3,4, Unstageable, DTI, NWPT, and Complex wounds) if present, place Wound referral order by RN under : No    New Ostomies, if present place, Ostomy referral order under : No     Nurse 1 eSignature: Electronically signed by Areli Larry RN on 3/12/24 at 1:38 AM EDT    **SHARE this note so that the co-signing nurse can place an eSignature**    Nurse 2 eSignature: Electronically signed by Sallie Breaux RN on 3/12/24 at 1:40 AM EDT   
CLINICAL PHARMACY NOTE: MEDS TO BEDS    Total # of Prescriptions Filled: 3   The following medications were delivered to the patient:  Folic Acid 1 mg  Pantoprazole 40 mg  Sucralfate 1 gm    Additional Documentation:    
    Seth Kim, AMBER - CNP  3/12/2024  9:15 AM

## 2024-03-13 NOTE — TELEPHONE ENCOUNTER
Care Transitions Initial Follow Up Call    Outreach made within 2 business days of discharge: Yes    Patient: Jesus Briscoe Patient : 1965   MRN: [id number]  Reason for Admission: There are no discharge diagnoses documented for the most recent discharge.  Discharge Date: 3/13/24       Spoke with: patient    Discharge department/facility: [facility] Patient Contact:  Was patient able to fill all prescriptions: Yes  Was patient instructed to bring all medications to the follow-up visit: Yes  Is patient taking all medications as directed in the discharge summary? Yes  Does patient understand their discharge instructions: Yes  Does patient have questions or concerns that need addressed prior to 7-14 day follow up office visit: no    Scheduled appointment with PCP within 7-14 days--appt scheduled 3/18/24.    Follow Up  Future Appointments   Date Time Provider Department Center   2024 10:00 AM Tyree Ellison DO [facility]       Elaina Abrams

## 2024-03-13 NOTE — DISCHARGE INSTRUCTIONS
Your information:  Name: Jesus Briscoe  : 1965    Your instructions:    You are being discharged home. Please follow up with your PCP and take your medications as prescribed.     IF YOU EXPERIENCE ANY OF THE FOLLOWING SYMPTOMS, CHEST PAIN, SHORTNESS OF BREATH, COUGHING UP BLOOD OR BLOODY SPUTUM, STOMACH PAIN OR CRAMPING, DARK, TARRY STOOLS, LOSS OF APPETITE, GENERAL NOT FEELING WELL, SIGNS AND SYMPTOMS OF INFECTION LIKE FEVER AND OR CHILLS, PLEASE CALL DR SUH OR RETURN TO THE EMERGENCY ROOM.     What to do after you leave the hospital:    Recommended diet: regular diet    Recommended activity: activity as tolerated        The following personal items were collected during your admission and were returned to you:    Belongings  Dental Appliances: None  Vision - Corrective Lenses: None  Hearing Aid: None  Clothing: Footwear, Jacket/Coat, Shirt, Pants  Jewelry: None  Electronic Devices: Cell Phone  Weapons (Notify Protective Services/Security): None  Other Valuables: Wallet  Home Medications: None  Valuables Given To: Patient  Provide Name(s) of Who Valuable(s) Were Given To: ppt  Responsible person(s) in the waiting room: n/a  Patient approves for provider to speak to responsible person post operatively: Yes    Information obtained by:  By signing below, I understand that if any problems occur once I leave the hospital I am to contact PCP.  I understand and acknowledge receipt of the instructions indicated above.

## 2024-03-18 ENCOUNTER — OFFICE VISIT (OUTPATIENT)
Dept: FAMILY MEDICINE CLINIC | Age: 59
End: 2024-03-18
Payer: MEDICAID

## 2024-03-18 ENCOUNTER — HOSPITAL ENCOUNTER (INPATIENT)
Age: 59
LOS: 3 days | Discharge: NURSING, REHAB OR LONG-TERM CARE | End: 2024-03-22
Attending: EMERGENCY MEDICINE | Admitting: PSYCHIATRY & NEUROLOGY
Payer: MEDICAID

## 2024-03-18 ENCOUNTER — SOCIAL WORK (OUTPATIENT)
Dept: FAMILY MEDICINE CLINIC | Age: 59
End: 2024-03-18

## 2024-03-18 VITALS
OXYGEN SATURATION: 99 % | DIASTOLIC BLOOD PRESSURE: 90 MMHG | HEART RATE: 77 BPM | SYSTOLIC BLOOD PRESSURE: 132 MMHG | WEIGHT: 184 LBS | BODY MASS INDEX: 23 KG/M2 | TEMPERATURE: 97.2 F

## 2024-03-18 DIAGNOSIS — R45.851 SUICIDAL IDEATION: ICD-10-CM

## 2024-03-18 DIAGNOSIS — F10.10 ALCOHOL ABUSE: ICD-10-CM

## 2024-03-18 DIAGNOSIS — F10.20 UNCOMPLICATED ALCOHOL DEPENDENCE (HCC): ICD-10-CM

## 2024-03-18 DIAGNOSIS — F32.2 SEVERE DEPRESSION (HCC): ICD-10-CM

## 2024-03-18 DIAGNOSIS — F39 MOOD DISORDER (HCC): Primary | ICD-10-CM

## 2024-03-18 DIAGNOSIS — F60.9 PERSONALITY DISORDER (HCC): ICD-10-CM

## 2024-03-18 DIAGNOSIS — F10.90 ALCOHOL USE DISORDER: ICD-10-CM

## 2024-03-18 DIAGNOSIS — Z09 HOSPITAL DISCHARGE FOLLOW-UP: Primary | ICD-10-CM

## 2024-03-18 LAB
ALBUMIN SERPL-MCNC: 4.6 G/DL (ref 3.5–5.2)
ALP SERPL-CCNC: 61 U/L (ref 40–129)
ALT SERPL-CCNC: 125 U/L (ref 0–40)
AMPHET UR QL SCN: NEGATIVE
ANION GAP SERPL CALCULATED.3IONS-SCNC: 16 MMOL/L (ref 7–16)
APAP SERPL-MCNC: <5 UG/ML (ref 10–30)
AST SERPL-CCNC: 65 U/L (ref 0–39)
BARBITURATES UR QL SCN: NEGATIVE
BASOPHILS # BLD: 0.11 K/UL (ref 0–0.2)
BASOPHILS NFR BLD: 2 % (ref 0–2)
BENZODIAZ UR QL: NEGATIVE
BILIRUB SERPL-MCNC: 0.3 MG/DL (ref 0–1.2)
BUN SERPL-MCNC: 13 MG/DL (ref 6–20)
BUPRENORPHINE UR QL: NEGATIVE
CALCIUM SERPL-MCNC: 8.7 MG/DL (ref 8.6–10.2)
CANNABINOIDS UR QL SCN: NEGATIVE
CHLORIDE SERPL-SCNC: 108 MMOL/L (ref 98–107)
CO2 SERPL-SCNC: 21 MMOL/L (ref 22–29)
COCAINE UR QL SCN: NEGATIVE
CREAT SERPL-MCNC: 0.8 MG/DL (ref 0.7–1.2)
EOSINOPHIL # BLD: 0.21 K/UL (ref 0.05–0.5)
EOSINOPHILS RELATIVE PERCENT: 3 % (ref 0–6)
ERYTHROCYTE [DISTWIDTH] IN BLOOD BY AUTOMATED COUNT: 16.3 % (ref 11.5–15)
ETHANOLAMINE SERPL-MCNC: 134 MG/DL
FENTANYL UR QL: NEGATIVE
GFR SERPL CREATININE-BSD FRML MDRD: >60 ML/MIN/1.73M2
GLUCOSE SERPL-MCNC: 85 MG/DL (ref 74–99)
HCT VFR BLD AUTO: 37.6 % (ref 37–54)
HGB BLD-MCNC: 12.4 G/DL (ref 12.5–16.5)
IMM GRANULOCYTES # BLD AUTO: <0.03 K/UL (ref 0–0.58)
IMM GRANULOCYTES NFR BLD: 0 % (ref 0–5)
LYMPHOCYTES NFR BLD: 2.95 K/UL (ref 1.5–4)
LYMPHOCYTES RELATIVE PERCENT: 45 % (ref 20–42)
MCH RBC QN AUTO: 34 PG (ref 26–35)
MCHC RBC AUTO-ENTMCNC: 33 G/DL (ref 32–34.5)
MCV RBC AUTO: 103 FL (ref 80–99.9)
METHADONE UR QL: NEGATIVE
MONOCYTES NFR BLD: 0.71 K/UL (ref 0.1–0.95)
MONOCYTES NFR BLD: 11 % (ref 2–12)
NEUTROPHILS NFR BLD: 39 % (ref 43–80)
NEUTS SEG NFR BLD: 2.54 K/UL (ref 1.8–7.3)
OPIATES UR QL SCN: NEGATIVE
OXYCODONE UR QL SCN: NEGATIVE
PCP UR QL SCN: NEGATIVE
PLATELET # BLD AUTO: 339 K/UL (ref 130–450)
PMV BLD AUTO: 9.2 FL (ref 7–12)
POTASSIUM SERPL-SCNC: 4 MMOL/L (ref 3.5–5)
PROT SERPL-MCNC: 7.2 G/DL (ref 6.4–8.3)
RBC # BLD AUTO: 3.65 M/UL (ref 3.8–5.8)
SALICYLATES SERPL-MCNC: <0.3 MG/DL (ref 0–30)
SODIUM SERPL-SCNC: 145 MMOL/L (ref 132–146)
TEST INFORMATION: NORMAL
TOXIC TRICYCLIC SC,BLOOD: NEGATIVE
WBC OTHER # BLD: 6.5 K/UL (ref 4.5–11.5)

## 2024-03-18 PROCEDURE — 99285 EMERGENCY DEPT VISIT HI MDM: CPT

## 2024-03-18 PROCEDURE — 6370000000 HC RX 637 (ALT 250 FOR IP): Performed by: EMERGENCY MEDICINE

## 2024-03-18 PROCEDURE — 1111F DSCHRG MED/CURRENT MED MERGE: CPT | Performed by: SURGERY

## 2024-03-18 PROCEDURE — 99215 OFFICE O/P EST HI 40 MIN: CPT | Performed by: SURGERY

## 2024-03-18 PROCEDURE — 80307 DRUG TEST PRSMV CHEM ANLYZR: CPT

## 2024-03-18 PROCEDURE — 80143 DRUG ASSAY ACETAMINOPHEN: CPT

## 2024-03-18 PROCEDURE — 93005 ELECTROCARDIOGRAM TRACING: CPT | Performed by: EMERGENCY MEDICINE

## 2024-03-18 PROCEDURE — 80179 DRUG ASSAY SALICYLATE: CPT

## 2024-03-18 PROCEDURE — 85025 COMPLETE CBC W/AUTO DIFF WBC: CPT

## 2024-03-18 PROCEDURE — 80053 COMPREHEN METABOLIC PANEL: CPT

## 2024-03-18 PROCEDURE — G0480 DRUG TEST DEF 1-7 CLASSES: HCPCS

## 2024-03-18 RX ORDER — LORAZEPAM 2 MG/ML
2 INJECTION INTRAMUSCULAR
Status: DISCONTINUED | OUTPATIENT
Start: 2024-03-18 | End: 2024-03-19

## 2024-03-18 RX ORDER — LORAZEPAM 1 MG/1
1 TABLET ORAL
Status: DISCONTINUED | OUTPATIENT
Start: 2024-03-18 | End: 2024-03-19

## 2024-03-18 RX ORDER — LANOLIN ALCOHOL/MO/W.PET/CERES
100 CREAM (GRAM) TOPICAL DAILY
Status: DISCONTINUED | OUTPATIENT
Start: 2024-03-19 | End: 2024-03-22 | Stop reason: HOSPADM

## 2024-03-18 RX ORDER — LORAZEPAM 1 MG/1
4 TABLET ORAL
Status: DISCONTINUED | OUTPATIENT
Start: 2024-03-18 | End: 2024-03-19

## 2024-03-18 RX ORDER — LORAZEPAM 1 MG/1
2 TABLET ORAL
Status: DISCONTINUED | OUTPATIENT
Start: 2024-03-18 | End: 2024-03-19

## 2024-03-18 RX ORDER — LORAZEPAM 2 MG/ML
1 INJECTION INTRAMUSCULAR
Status: DISCONTINUED | OUTPATIENT
Start: 2024-03-18 | End: 2024-03-19

## 2024-03-18 RX ORDER — LORAZEPAM 1 MG/1
3 TABLET ORAL
Status: DISCONTINUED | OUTPATIENT
Start: 2024-03-18 | End: 2024-03-19

## 2024-03-18 RX ORDER — LORAZEPAM 1 MG/1
1 TABLET ORAL ONCE
Status: COMPLETED | OUTPATIENT
Start: 2024-03-18 | End: 2024-03-18

## 2024-03-18 RX ORDER — SODIUM CHLORIDE 9 MG/ML
INJECTION, SOLUTION INTRAVENOUS PRN
Status: DISCONTINUED | OUTPATIENT
Start: 2024-03-18 | End: 2024-03-19

## 2024-03-18 RX ORDER — SODIUM CHLORIDE 0.9 % (FLUSH) 0.9 %
5-40 SYRINGE (ML) INJECTION PRN
Status: DISCONTINUED | OUTPATIENT
Start: 2024-03-18 | End: 2024-03-19

## 2024-03-18 RX ORDER — LORAZEPAM 2 MG/ML
4 INJECTION INTRAMUSCULAR
Status: DISCONTINUED | OUTPATIENT
Start: 2024-03-18 | End: 2024-03-19

## 2024-03-18 RX ORDER — LORAZEPAM 2 MG/ML
3 INJECTION INTRAMUSCULAR
Status: DISCONTINUED | OUTPATIENT
Start: 2024-03-18 | End: 2024-03-19

## 2024-03-18 RX ORDER — SODIUM CHLORIDE 0.9 % (FLUSH) 0.9 %
5-40 SYRINGE (ML) INJECTION EVERY 12 HOURS SCHEDULED
Status: DISCONTINUED | OUTPATIENT
Start: 2024-03-18 | End: 2024-03-19

## 2024-03-18 RX ADMIN — LORAZEPAM 1 MG: 1 TABLET ORAL at 17:14

## 2024-03-18 RX ADMIN — LORAZEPAM 1 MG: 1 TABLET ORAL at 22:43

## 2024-03-18 ASSESSMENT — PATIENT HEALTH QUESTIONNAIRE - PHQ9
5. POOR APPETITE OR OVEREATING: NEARLY EVERY DAY
SUM OF ALL RESPONSES TO PHQ QUESTIONS 1-9: 27
10. IF YOU CHECKED OFF ANY PROBLEMS, HOW DIFFICULT HAVE THESE PROBLEMS MADE IT FOR YOU TO DO YOUR WORK, TAKE CARE OF THINGS AT HOME, OR GET ALONG WITH OTHER PEOPLE: EXTREMELY DIFFICULT
2. FEELING DOWN, DEPRESSED OR HOPELESS: NEARLY EVERY DAY
6. FEELING BAD ABOUT YOURSELF - OR THAT YOU ARE A FAILURE OR HAVE LET YOURSELF OR YOUR FAMILY DOWN: NEARLY EVERY DAY
SUM OF ALL RESPONSES TO PHQ QUESTIONS 1-9: 24
4. FEELING TIRED OR HAVING LITTLE ENERGY: NEARLY EVERY DAY
9. THOUGHTS THAT YOU WOULD BE BETTER OFF DEAD, OR OF HURTING YOURSELF: NEARLY EVERY DAY
1. LITTLE INTEREST OR PLEASURE IN DOING THINGS: NEARLY EVERY DAY
SUM OF ALL RESPONSES TO PHQ QUESTIONS 1-9: 27
8. MOVING OR SPEAKING SO SLOWLY THAT OTHER PEOPLE COULD HAVE NOTICED. OR THE OPPOSITE, BEING SO FIGETY OR RESTLESS THAT YOU HAVE BEEN MOVING AROUND A LOT MORE THAN USUAL: NEARLY EVERY DAY
SUM OF ALL RESPONSES TO PHQ QUESTIONS 1-9: 27
SUM OF ALL RESPONSES TO PHQ9 QUESTIONS 1 & 2: 6
7. TROUBLE CONCENTRATING ON THINGS, SUCH AS READING THE NEWSPAPER OR WATCHING TELEVISION: NEARLY EVERY DAY

## 2024-03-18 ASSESSMENT — COLUMBIA-SUICIDE SEVERITY RATING SCALE - C-SSRS
3. HAVE YOU BEEN THINKING ABOUT HOW YOU MIGHT KILL YOURSELF?: YES
4. HAVE YOU HAD THESE THOUGHTS AND HAD SOME INTENTION OF ACTING ON THEM?: YES
1. WITHIN THE PAST MONTH, HAVE YOU WISHED YOU WERE DEAD OR WISHED YOU COULD GO TO SLEEP AND NOT WAKE UP?: YES
2. HAVE YOU ACTUALLY HAD ANY THOUGHTS OF KILLING YOURSELF?: YES
6. HAVE YOU EVER DONE ANYTHING, STARTED TO DO ANYTHING, OR PREPARED TO DO ANYTHING TO END YOUR LIFE?: NO
5. HAVE YOU STARTED TO WORK OUT OR WORKED OUT THE DETAILS OF HOW TO KILL YOURSELF? DO YOU INTEND TO CARRY OUT THIS PLAN?: NO

## 2024-03-18 ASSESSMENT — LIFESTYLE VARIABLES
HOW OFTEN DO YOU HAVE A DRINK CONTAINING ALCOHOL: 4 OR MORE TIMES A WEEK
HOW MANY STANDARD DRINKS CONTAINING ALCOHOL DO YOU HAVE ON A TYPICAL DAY: 5 OR 6

## 2024-03-18 ASSESSMENT — PAIN - FUNCTIONAL ASSESSMENT: PAIN_FUNCTIONAL_ASSESSMENT: NONE - DENIES PAIN

## 2024-03-18 NOTE — ED NOTES
Pt reporting his dr told him to come here for detox states he has a bed tomorrow at detox with [facility].  Pt requesting to know when he will see dr stated he needs something for detox informed pt that we first obtain labs to see what is or isn't compatible with detox medications as well as etoh level. He states \"well if they aren't going to do anything I just want to leave. \"

## 2024-03-18 NOTE — PROGRESS NOTES
Post-Discharge Transitional Care  Follow Up      Jesus Briscoe   YOB: 1965    Date of Office Visit:  3/18/2024  Date of Hospital Admission: 3/11/24  Date of Hospital Discharge: 3/13/24  Risk of hospital readmission (high >=14%. Medium >=10%) :Readmission Risk Score: 10      Care management risk score Rising risk (score 2-5) and Complex Care (Scores >=6): No Risk Score On File     Non face to face  following discharge, date last encounter closed (first attempt may have been earlier): 03/13/2024    Call initiated 2 business days of discharge: Yes    ASSESSMENT/PLAN:   Hospital discharge follow-up  -     DC DISCHARGE MEDS RECONCILED W/ CURRENT OUTPATIENT MED LIST    Medical Decision Making: high complexity  Return in 4 weeks (on 4/15/2024), or if symptoms worsen or fail to improve.    On this date 3/18/2024 I have spent 40 minutes reviewing previous notes, test results and face to face with the patient discussing the diagnosis and importance of compliance with the treatment plan as well as documenting on the day of the visit.       Subjective:   HPI:  Follow up of Hospital problems/diagnosis(es):       Patient is a 59-year-old male who presented to the ED with due to syncopal events.  Patient has a history of alcohol abuse and tobacco abuse.  States for the last 3 years he has been having increasing alcohol intake  States he drinks about a liter of vodka daily for the last 3 years.  He has been to rehab in november of last year when he was sober for about 45 days.  However he resumed drinking and this past Saturday he decided to stop drinking. However he resumed drinking today but only about 8 ounces.  States he was seated at bar when he began to feel unwell. and then he passed out.  States that he was told he passed out twice.  He denies any chest pain.  Denies any palpitations.  He did have some episodes of dry heaves yesterday.  However he denies any nausea or emesis today.  He does have

## 2024-03-18 NOTE — ED PROVIDER NOTES
HPI:  3/18/24, Time: 5:01 PM EDT         Jesus Briscoe is a 58 y.o. male presenting to the ED for psych evaluation.  Patient presents from his PCPs office.  I spoke with the provider prior to arrival.  Stated that patient made suicidal comments.  Sent to the ED for psychiatric evaluation.  Patient states he is feeling suicidal.  He states that he has lots of plans but is never attempted to harm himself in the past.  He is a daily drinker, last drink this morning.  He is supposed to start detox tomorrow, and he is adamant that he needs to enter detox.  He has no acute medical complaints    --------------------------------------------- PAST HISTORY ---------------------------------------------  Past Medical History:  has a past medical history of Depression and Hypertension.    Past Surgical History:  has a past surgical history that includes shoulder surgery (Left).    Social History:  reports that he quit smoking about 4 months ago. His smoking use included cigarettes. He started smoking about 39 years ago. He has a 19.4 pack-year smoking history. He has never used smokeless tobacco. He reports that he does not currently use alcohol. He reports that he does not use drugs.    Family History: family history is not on file.     The patient’s home medications have been reviewed.    Allergies: Patient has no known allergies.    -------------------------------------------------- RESULTS -------------------------------------------------  All laboratory and radiology results have been personally reviewed by myself   LABS:  Results for orders placed or performed during the hospital encounter of 03/18/24   URINE DRUG SCREEN   Result Value Ref Range    Amphetamine Screen, Ur NEGATIVE NEGATIVE    Barbiturate Screen, Ur NEGATIVE NEGATIVE    Benzodiazepine Screen, Urine NEGATIVE NEGATIVE    Cocaine Metabolite, Urine NEGATIVE NEGATIVE    Methadone Screen, Urine NEGATIVE NEGATIVE    Opiates, Urine NEGATIVE NEGATIVE     None    Chronic Conditions affecting care:    has a past medical history of Depression and Hypertension.     Medical Decision Making  Problems Addressed:  Alcohol abuse: chronic illness or injury  Mood disorder (HCC): acute illness or injury    Amount and/or Complexity of Data Reviewed  External Data Reviewed: ECG and notes.  Labs: ordered. Decision-making details documented in ED Course.  ECG/medicine tests: ordered and independent interpretation performed. Decision-making details documented in ED Course.    Risk  Prescription drug management.  Decision regarding hospitalization.           ED Course as of 03/18/24 1810   Mon Mar 18, 2024   1646 EKG:  This EKG is signed and interpreted by Abbi gong MD.    Rate: 71  Rhythm: Sinus  Interpretation: Sinus rhythm, first-degree heart block, left anterior fascicular block, normal QTc, no acute ST or T wave changes  Comparison: stable as compared to patient's most recent EKG   [JA]   1647 I reviewed the patient's chart.    Patient admitted on 3/11/2024 for syncope, JENNA, alcohol abuse. [JA]      ED Course User Index  [JA] Abbi Suazo MD       Patient remained hemodynamically stable throughout ED course.     New Prescriptions    No medications on file         Counseling:   The emergency provider has spoken with the patient and discussed today’s results, in addition to providing specific details for the plan of care and counseling regarding the diagnosis and prognosis.  Questions are answered at this time and they are agreeable with the plan.      --------------------------------- IMPRESSION AND DISPOSITION ---------------------------------    IMPRESSION  1. Mood disorder (HCC)    2. Alcohol abuse        DISPOSITION  Disposition: Admit to mental health unit - medically cleared for admission  Patient condition is stable      NOTE: This report was transcribed using voice recognition software. Every effort was made to ensure accuracy; however,

## 2024-03-19 PROBLEM — F32.9 MDD (MAJOR DEPRESSIVE DISORDER), SINGLE EPISODE: Status: ACTIVE | Noted: 2024-03-19

## 2024-03-19 LAB
EKG ATRIAL RATE: 71 BPM
EKG P AXIS: 68 DEGREES
EKG P-R INTERVAL: 220 MS
EKG Q-T INTERVAL: 408 MS
EKG QRS DURATION: 94 MS
EKG QTC CALCULATION (BAZETT): 443 MS
EKG R AXIS: -49 DEGREES
EKG T AXIS: 40 DEGREES
EKG VENTRICULAR RATE: 71 BPM

## 2024-03-19 PROCEDURE — 6370000000 HC RX 637 (ALT 250 FOR IP): Performed by: PSYCHIATRY & NEUROLOGY

## 2024-03-19 PROCEDURE — 6370000000 HC RX 637 (ALT 250 FOR IP): Performed by: NURSE PRACTITIONER

## 2024-03-19 PROCEDURE — 1240000000 HC EMOTIONAL WELLNESS R&B

## 2024-03-19 PROCEDURE — 93010 ELECTROCARDIOGRAM REPORT: CPT | Performed by: INTERNAL MEDICINE

## 2024-03-19 PROCEDURE — 6370000000 HC RX 637 (ALT 250 FOR IP): Performed by: EMERGENCY MEDICINE

## 2024-03-19 RX ORDER — CHLORDIAZEPOXIDE HYDROCHLORIDE 25 MG/1
25 CAPSULE, GELATIN COATED ORAL EVERY 4 HOURS
Status: DISCONTINUED | OUTPATIENT
Start: 2024-03-19 | End: 2024-03-21

## 2024-03-19 RX ORDER — FOLIC ACID 1 MG/1
1 TABLET ORAL ONCE
Status: DISCONTINUED | OUTPATIENT
Start: 2024-03-19 | End: 2024-03-22 | Stop reason: HOSPADM

## 2024-03-19 RX ORDER — HALOPERIDOL 1 MG/1
3 TABLET ORAL EVERY 6 HOURS PRN
Status: DISCONTINUED | OUTPATIENT
Start: 2024-03-19 | End: 2024-03-19

## 2024-03-19 RX ORDER — HYDROXYZINE PAMOATE 50 MG/1
50 CAPSULE ORAL 3 TIMES DAILY PRN
Status: DISCONTINUED | OUTPATIENT
Start: 2024-03-19 | End: 2024-03-22 | Stop reason: HOSPADM

## 2024-03-19 RX ORDER — LISINOPRIL 10 MG/1
10 TABLET ORAL DAILY
Status: DISCONTINUED | OUTPATIENT
Start: 2024-03-19 | End: 2024-03-22 | Stop reason: HOSPADM

## 2024-03-19 RX ORDER — HALOPERIDOL 5 MG/ML
3 INJECTION INTRAMUSCULAR EVERY 6 HOURS PRN
Status: DISCONTINUED | OUTPATIENT
Start: 2024-03-19 | End: 2024-03-19

## 2024-03-19 RX ORDER — DIVALPROEX SODIUM 250 MG/1
250 TABLET, DELAYED RELEASE ORAL 2 TIMES DAILY
Status: DISCONTINUED | OUTPATIENT
Start: 2024-03-19 | End: 2024-03-20

## 2024-03-19 RX ORDER — NICOTINE 21 MG/24HR
1 PATCH, TRANSDERMAL 24 HOURS TRANSDERMAL DAILY
Status: DISCONTINUED | OUTPATIENT
Start: 2024-03-20 | End: 2024-03-22 | Stop reason: HOSPADM

## 2024-03-19 RX ORDER — HALOPERIDOL 5 MG/1
5 TABLET ORAL EVERY 6 HOURS PRN
Status: DISCONTINUED | OUTPATIENT
Start: 2024-03-19 | End: 2024-03-22 | Stop reason: HOSPADM

## 2024-03-19 RX ORDER — ACETAMINOPHEN 325 MG/1
650 TABLET ORAL EVERY 4 HOURS PRN
Status: DISCONTINUED | OUTPATIENT
Start: 2024-03-19 | End: 2024-03-22 | Stop reason: HOSPADM

## 2024-03-19 RX ORDER — DIVALPROEX SODIUM 250 MG/1
250 TABLET, DELAYED RELEASE ORAL EVERY 12 HOURS SCHEDULED
Status: DISCONTINUED | OUTPATIENT
Start: 2024-03-19 | End: 2024-03-19 | Stop reason: SDUPTHER

## 2024-03-19 RX ORDER — LANOLIN ALCOHOL/MO/W.PET/CERES
3 CREAM (GRAM) TOPICAL NIGHTLY PRN
Status: DISCONTINUED | OUTPATIENT
Start: 2024-03-19 | End: 2024-03-22 | Stop reason: HOSPADM

## 2024-03-19 RX ORDER — HALOPERIDOL 5 MG/ML
5 INJECTION INTRAMUSCULAR EVERY 6 HOURS PRN
Status: DISCONTINUED | OUTPATIENT
Start: 2024-03-19 | End: 2024-03-22 | Stop reason: HOSPADM

## 2024-03-19 RX ORDER — FOLIC ACID 1 MG/1
1 TABLET ORAL DAILY
Status: DISCONTINUED | OUTPATIENT
Start: 2024-03-19 | End: 2024-03-22 | Stop reason: HOSPADM

## 2024-03-19 RX ORDER — HYDROXYZINE HYDROCHLORIDE 10 MG/1
25 TABLET, FILM COATED ORAL 3 TIMES DAILY PRN
Status: DISCONTINUED | OUTPATIENT
Start: 2024-03-19 | End: 2024-03-19

## 2024-03-19 RX ORDER — MULTIVITAMIN WITH IRON
1 TABLET ORAL DAILY
Status: DISCONTINUED | OUTPATIENT
Start: 2024-03-20 | End: 2024-03-22 | Stop reason: HOSPADM

## 2024-03-19 RX ORDER — MAGNESIUM HYDROXIDE/ALUMINUM HYDROXICE/SIMETHICONE 120; 1200; 1200 MG/30ML; MG/30ML; MG/30ML
30 SUSPENSION ORAL PRN
Status: DISCONTINUED | OUTPATIENT
Start: 2024-03-19 | End: 2024-03-22 | Stop reason: HOSPADM

## 2024-03-19 RX ADMIN — LISINOPRIL 10 MG: 10 TABLET ORAL at 16:46

## 2024-03-19 RX ADMIN — LORAZEPAM 2 MG: 1 TABLET ORAL at 01:55

## 2024-03-19 RX ADMIN — Medication 100 MG: at 12:44

## 2024-03-19 RX ADMIN — CHLORDIAZEPOXIDE HYDROCHLORIDE 25 MG: 25 CAPSULE ORAL at 16:46

## 2024-03-19 RX ADMIN — CHLORDIAZEPOXIDE HYDROCHLORIDE 25 MG: 25 CAPSULE ORAL at 21:31

## 2024-03-19 RX ADMIN — FOLIC ACID 1 MG: 1 TABLET ORAL at 17:57

## 2024-03-19 RX ADMIN — CHLORDIAZEPOXIDE HYDROCHLORIDE 25 MG: 25 CAPSULE ORAL at 12:44

## 2024-03-19 RX ADMIN — DIVALPROEX SODIUM 250 MG: 250 TABLET, DELAYED RELEASE ORAL at 21:32

## 2024-03-19 RX ADMIN — HYDROXYZINE PAMOATE 50 MG: 50 CAPSULE ORAL at 21:37

## 2024-03-19 ASSESSMENT — PAIN - FUNCTIONAL ASSESSMENT: PAIN_FUNCTIONAL_ASSESSMENT: NONE - DENIES PAIN

## 2024-03-19 ASSESSMENT — LIFESTYLE VARIABLES
HOW MANY STANDARD DRINKS CONTAINING ALCOHOL DO YOU HAVE ON A TYPICAL DAY: 5 OR 6
HOW OFTEN DO YOU HAVE A DRINK CONTAINING ALCOHOL: 4 OR MORE TIMES A WEEK

## 2024-03-19 ASSESSMENT — SLEEP AND FATIGUE QUESTIONNAIRES
DO YOU USE A SLEEP AID: NO
SLEEP PATTERN: DIFFICULTY FALLING ASLEEP;DISTURBED/INTERRUPTED SLEEP;DIFFICULTY ARISING
AVERAGE NUMBER OF SLEEP HOURS: 5
DO YOU HAVE DIFFICULTY SLEEPING: YES
DO YOU HAVE DIFFICULTY SLEEPING: YES
AVERAGE NUMBER OF SLEEP HOURS: 5
SLEEP PATTERN: DIFFICULTY FALLING ASLEEP;DIFFICULTY ARISING;DISTURBED/INTERRUPTED SLEEP
DO YOU USE A SLEEP AID: YES

## 2024-03-19 ASSESSMENT — PATIENT HEALTH QUESTIONNAIRE - PHQ9
5. POOR APPETITE OR OVEREATING: MORE THAN HALF THE DAYS
10. IF YOU CHECKED OFF ANY PROBLEMS, HOW DIFFICULT HAVE THESE PROBLEMS MADE IT FOR YOU TO DO YOUR WORK, TAKE CARE OF THINGS AT HOME, OR GET ALONG WITH OTHER PEOPLE: EXTREMELY DIFFICULT
SUM OF ALL RESPONSES TO PHQ QUESTIONS 1-9: 25
SUM OF ALL RESPONSES TO PHQ QUESTIONS 1-9: 22
3. TROUBLE FALLING OR STAYING ASLEEP: NEARLY EVERY DAY
9. THOUGHTS THAT YOU WOULD BE BETTER OFF DEAD, OR OF HURTING YOURSELF: NEARLY EVERY DAY
4. FEELING TIRED OR HAVING LITTLE ENERGY: NEARLY EVERY DAY
SUM OF ALL RESPONSES TO PHQ QUESTIONS 1-9: 25
7. TROUBLE CONCENTRATING ON THINGS, SUCH AS READING THE NEWSPAPER OR WATCHING TELEVISION: NEARLY EVERY DAY
8. MOVING OR SPEAKING SO SLOWLY THAT OTHER PEOPLE COULD HAVE NOTICED. OR THE OPPOSITE, BEING SO FIGETY OR RESTLESS THAT YOU HAVE BEEN MOVING AROUND A LOT MORE THAN USUAL: NEARLY EVERY DAY
6. FEELING BAD ABOUT YOURSELF - OR THAT YOU ARE A FAILURE OR HAVE LET YOURSELF OR YOUR FAMILY DOWN: MORE THAN HALF THE DAYS
2. FEELING DOWN, DEPRESSED OR HOPELESS: NEARLY EVERY DAY
1. LITTLE INTEREST OR PLEASURE IN DOING THINGS: NEARLY EVERY DAY
SUM OF ALL RESPONSES TO PHQ QUESTIONS 1-9: 25
SUM OF ALL RESPONSES TO PHQ9 QUESTIONS 1 & 2: 6

## 2024-03-19 NOTE — PROGRESS NOTES
[facility]  Admission Note   Patient admitted from sec G from PCP stating that he wants rehab and completed a rehab program in Nov 23 and relapsed 60 days later. Patient states that when he got to the ER he was honest with the doctor and states that there have been periods in his life that he is SI but he denies currently. Patient is cooperative for assessment. No active distress noted, respirations even and unlabored. Will continue to monitor and will intervene as needed.   Admission Type:   Involuntary     Reason for admission:  Reason for Admission: wanted to come in for detox and expressed that I have had SI thoughts in the past but am not currently      Addictive Behavior:   Addictive Behavior  In the Past 3 Months, Have You Felt or Has Someone Told You That You Have a Problem With  : None    Medical Problems:   Past Medical History:   Diagnosis Date    Depression     Hypertension        Status EXAM:  Mental Status and Behavioral Exam  Normal: No  Level of Assistance: Independent/Self  Facial Expression: Avoids Gaze  Affect: Appropriate  Level of Consciousness: Alert  Frequency of Checks: 4 times per hour, close  Mood:Normal: No  Mood: Anxious  Motor Activity:Normal: Yes  Eye Contact: Fair  Observed Behavior: Cooperative, Friendly  Sexual Misconduct History: Past - no  Preception: Adams to person, Adams to time, Adams to place, Adams to situation  Attention:Normal: Yes  Thought Processes: Other (comment)  Thought Content:Normal: Yes  Depression Symptoms: No problems reported or observed.  Anxiety Symptoms: Generalized  Sima Symptoms: No problems reported or observed.  Hallucinations: None  Delusions: No  Memory:Normal: No  Memory: Poor recent, Poor remote  Insight and Judgment: No  Insight and Judgment: Poor judgment, Poor insight    Tobacco Screening:  Practical Counseling, on admission, romulo X, if applicable and completed (first 3 are required if patient doesn't refuse)         (x )

## 2024-03-19 NOTE — ED NOTES
Received call back from Yao TINOCO, discussed pt stated will confer with dr Felix and call this rn back with plan of care decision.

## 2024-03-19 NOTE — ED NOTES
met with patient at bedside for brief assessment.    Patient is a 58-year-old male who presented into the ED by EMS for a psychiatric evaluation. Per triage note patient sent from [facility] - [facility]zef jakex[facility], always has SI sometimes has \"fantastical plans\" but not today. Patient admits to going to his PCP appointment today and being honest with questionnaire. Patient admits to having on/off thoughts for years now. Patient admits to being a alcoholic and feeling guilty at times and making poor decisions. Patient denies to any current SI, plan or intent. Patient denies any history of suicide attempts, self injurious behaviors, HI, A/V hallucinations or paranoia.    Patient admits to smoking THC occasionally. Patient admits to drinking daily the last 3 years. Patient admits to sipping vodka or [place] all day. Patient admits to drinking a pint a day. Patient admits to current withdrawal symptoms including sweats, anxiety and shakes. Patient admits to [facility]. Patient requesting to detox here and follow-up with plan to [facility].    Patient admits to a history of MH and being diagnosed with anxiety, depression and OCD. Patient currently not linked with any outpatient MH services. Patient reports he is supposed to be taking OCD medication, however has not taken it in several months due to it affecting his sex life and functioning. Patient has no history of MH hospitalization.    Patient denies having a legal guardian/POA, legal issues abuse or violence. Patient reports he lives with his cousin and . Patient admits to a weapon being in the home however it being locked up.     recommending ED observation overnight with reassess in AM for likely D/[facility] to [facility] for detox/rehab treatment.

## 2024-03-19 NOTE — PROGRESS NOTES
LSW met with patient at [facility] following visit with PCP. Pt reported to PCP that he was feeling suicidal (PHQ-9 score of 27) and PCP requested LSW to meet with him.   Pt reports he has a positive social support of his cousin Opal and her  Dino; who he currently resides with. Pt is  and has children. He does not report his children as positive supports due to not wanting to hinder them with his problems.   Pt reports having significant substance dependence and is supposed to start detox on 3.19.24. Pt reports consuming alcohol daily and the last time consuming alcohol was this morning.  Pt states he has been feeling depressed for about 40 years, but it has gotten significantly worse in the last 3 years because of social isolation resulting from COVID-19. Pt reported having a stressful job, but quit the job in July of 2023 and has been unemployed since then.  Pt states he has access to means to end his life, guns and medications, but cannot follow through with the attempt. He reports just liking to keep it in the back of his head if life gets too hard.   Encounter was abruptly ended due to ambulance arriving to take patient to [facility] for medical clearing prior to going to Psychiatric floor.   Pt was not pink slipped due to being willing to sign himself into psych floor per Dr. Ellison. Pt was transported to [facility] by ambulance on 3.18.24.

## 2024-03-19 NOTE — CARE COORDINATION
Biopsychosocial Assessment Note    Social work met with patient to complete the biopsychosocial assessment and C-SSRS.     Chief Complaint: \"I was being honest\"    Mental Status Exam: Pt presents as A&Ox4, cooperative with fair eye contact. He is anxious and depressed with appropriate affect. Pt thoughts are organized. Pt insight/judgement are poor. Pt denied SI/HI/AVH.     Clinical Summary: Pt reports that he is here because he went to his PCP to talk with him about a psych evaluation as he struggles with alcoholism, anxiety, and depression. He reports that his PCP asked him if he had thoughts/plans for suicide, to which pt honestly answered yes. He reports that he has no intent for suicide, that since he was young, it has been more of a \"game\" in his head, or a \"comforting fantasy\" to know that if things get too bad here, he can always follow through with suicide. He reports that he does not have intent for suicide, but that he always has the thought in the back of his mind. He also states that he does not have it in him to commit suicide. Pt reports that he was supposed [facility] go to [facility] today.     Pt reports that this is his first psychiatric admission. He states that he has a hx of OCD dx and medications from PCP, but states that he has not been taking this medications as he does not know if this dx if accurate. He states that he has no hx of suicide attempts or self injurious behaviors. He does admit to feeling hopeless/helpless and having little interest/pleasure in doing things recently. Pt reports that he has had poor sleep and appetite, but appetite has been improving. Pt states that he has no trauma/abuse hx. He reports legal hx of DUI, but denied current charges, probation, or parole. Pt states that he uses a pint of alcohol daily since his relapse when the COVID pandemic started. He does report drug use of trying THC last week, but states that he does not care to use this again. Pt states

## 2024-03-19 NOTE — CARE COORDINATION
[facility] Support Note    Name: Jesus Briscoe  Date: 3/19/2024    Chief Complaint   Patient presents with    Suicidal     Sent from [facility], came whilling, always has SI sometimes has plans but not today. \"fantastical plans\" daily drinker, had total of 5 drinks today,        Peer Support met with patient.  [] Support and education provided  [] Resources provided   [x] Treatment referral: [facility]  [] Other:   [] Patient declined peer recovery services     Referred By: Daiana (BRITTON)    Notes: Spoke to [facility] who said he had an appointment at 12:30 today. They will need his clinicals faxed [phone] before he can be admitted.     Signed: Jillian Iniguez, 3/19/2024

## 2024-03-19 NOTE — ED NOTES
I met with pt, who advised that he came to the ER as recommended by his PCP.  Pt explained that he expressed needing help with alcoholism and wanted to get a psych eval.    Pt reports that he has had ongoing depression for a while and, although he has had on going plans to self harm, he said that he recently had suicidal thoughts to hang himself and overdose on pills.  Pt appears to be hopeless and emotionally unstable.  He recognizes the need for AOD treatment, but I would recommend a psych eval prior to entering tx.          Pt will be evaluated for safety and stability.

## 2024-03-19 NOTE — PROGRESS NOTES
4 Eyes Skin Assessment     NAME:  Jesus Briscoe  YOB: 1965  MEDICAL RECORD NUMBER:  [id number]    The patient is being assessed for  Admission    I agree that at least one RN has performed a thorough Head to Toe Skin Assessment on the patient. ALL assessment sites listed below have been assessed.      Areas assessed by both nurses:    Head, Face, Ears, Shoulders, Back, Chest, Arms, Elbows, Hands, Sacrum. Buttock, Coccyx, Ischium, Legs. Feet and Heels, and Under Medical Devices         Does the Patient have a Wound? No noted wound(s)       Poncho Prevention initiated by RN: No  Wound Care Orders initiated by RN: No    Pressure Injury (Stage 3,4, Unstageable, DTI, NWPT, and Complex wounds) if present, place Wound referral order by RN under : No    New Ostomies, if present place, Ostomy referral order under : No     Nurse 1 eSignature: Electronically signed by Alicia Arteaga RN on 3/19/24 at 3:31 PM EDT    **SHARE this note so that the co-signing nurse can place an eSignature**    Nurse 2 eSignature: Electronically signed by James Garcia LPN on 3/19/24 at 3:36 PM EDT4 Eyes Skin Assessment

## 2024-03-19 NOTE — PLAN OF CARE
Problem: Self Harm/Suicidality  Goal: Will have no self-injury during hospital stay  Description: INTERVENTIONS:  1.  Ensure constant observer at bedside with Q15M safety checks  2.  Maintain a safe environment  3.  Secure patient belongings  4.  Ensure family/visitors adhere to safety recommendations  5.  Ensure safety tray has been added to patient's diet order  6.  Every shift and PRN: Re-assess suicidal risk via Frequent Screener    Outcome: Progressing     Problem: Depression  Goal: Will be euthymic at discharge  Description: INTERVENTIONS:  1. Administer medication as ordered  2. Provide emotional support via 1:1 interaction with staff  3. Encourage involvement in milieu/groups/activities  4. Monitor for social isolation  Outcome: Progressing     Problem: Sima  Goal: Will exhibit normal sleep and speech and no impulsivity  Description: INTERVENTIONS:  1. Administer medication as ordered  2. Set limits on impulsive behavior  3. Make attempts to decrease external stimuli as possible  Outcome: Progressing     Problem: Psychosis  Goal: Will report no hallucinations or delusions  Description: INTERVENTIONS:  1. Administer medication as  ordered  2. Assist with reality testing to support increasing orientation  3. Assess if patient's hallucinations or delusions are encouraging self harm or harm to others and intervene as appropriate  Outcome: Progressing     Problem: Behavior  Goal: Pt/Family maintain appropriate behavior and adhere to behavioral management agreement, if implemented  Description: INTERVENTIONS:  1. Assess patient/family's coping skills and  non-compliant behavior (including use of illegal substances)  2. Notify security of behavior or suspected illegal substances which indicate the need for search of the family and/or belongings  3. Encourage verbalization of thoughts and concerns in a socially appropriate manner  4. Utilize positive, consistent limit setting strategies supporting safety of

## 2024-03-19 NOTE — ED NOTES
Notified Yao TINOCO  of need for admission to [facility] answering machine received, message left will await return call

## 2024-03-19 NOTE — PROGRESS NOTES
Spiritual Support Group Note    Number of Participants in Group:        11                Time:  3 pm    Goal: Relief from isolation and loneliness             Orin Sharing             Self-understanding and gain insight              Acceptance and belonging            Recognize they are not alone                Socialization             Empowerment       Encouragement    Topic:  [] Spiritual Wellness and Self Care                  [] Hope                     [] Connecting with Divine/Others        [] Thankfulness and Gratitude               [x]  Meaningfulness and Purpose               [] Forgiveness               [x] Peace               [] Connect to Community      [] Other    Participation Level:   [x] Active Listener   [] Minimal   [] Monopolizing   [] Interactive   [] No Participation   []  Other:     Attention:   [x] Alert   [] Distractible   [] Drowsy   [] Poor   [] Other:    Manner:   [x] Cooperative   [] Suspicious   [] Withdrawn   [] Guarded   [] Irritable   [] Inhospitable   [] Other:     Others Comments from Group:

## 2024-03-20 PROBLEM — F10.20 UNCOMPLICATED ALCOHOL DEPENDENCE (HCC): Status: ACTIVE | Noted: 2024-03-20

## 2024-03-20 PROBLEM — F60.89 CLUSTER B PERSONALITY DISORDER (HCC): Status: ACTIVE | Noted: 2024-03-20

## 2024-03-20 PROBLEM — F06.34 MOOD DISORDER DUE TO KNOWN PHYSIOLOGICAL CONDITION WITH MIXED FEATURES: Status: ACTIVE | Noted: 2024-03-20

## 2024-03-20 PROBLEM — F06.34 MOOD DISORDER DUE TO KNOWN PHYSIOLOGICAL CONDITION WITH MIXED FEATURES: Status: RESOLVED | Noted: 2024-03-20 | Resolved: 2024-03-20

## 2024-03-20 PROBLEM — F39 MOOD DISORDER (HCC): Status: ACTIVE | Noted: 2024-03-20

## 2024-03-20 PROBLEM — F32.9 MDD (MAJOR DEPRESSIVE DISORDER), SINGLE EPISODE: Status: RESOLVED | Noted: 2024-03-19 | Resolved: 2024-03-20

## 2024-03-20 PROCEDURE — 6370000000 HC RX 637 (ALT 250 FOR IP): Performed by: PSYCHIATRY & NEUROLOGY

## 2024-03-20 PROCEDURE — 6370000000 HC RX 637 (ALT 250 FOR IP): Performed by: NURSE PRACTITIONER

## 2024-03-20 PROCEDURE — 90792 PSYCH DIAG EVAL W/MED SRVCS: CPT | Performed by: NURSE PRACTITIONER

## 2024-03-20 PROCEDURE — 1240000000 HC EMOTIONAL WELLNESS R&B

## 2024-03-20 RX ORDER — ACAMPROSATE CALCIUM 333 MG/1
333 TABLET, DELAYED RELEASE ORAL 3 TIMES DAILY
Status: DISCONTINUED | OUTPATIENT
Start: 2024-03-20 | End: 2024-03-21

## 2024-03-20 RX ORDER — OXCARBAZEPINE 300 MG/1
300 TABLET, FILM COATED ORAL 2 TIMES DAILY
Status: DISCONTINUED | OUTPATIENT
Start: 2024-03-20 | End: 2024-03-22 | Stop reason: HOSPADM

## 2024-03-20 RX ADMIN — OXCARBAZEPINE 300 MG: 300 TABLET, FILM COATED ORAL at 20:27

## 2024-03-20 RX ADMIN — DIVALPROEX SODIUM 250 MG: 250 TABLET, DELAYED RELEASE ORAL at 09:45

## 2024-03-20 RX ADMIN — MULTIVITAMIN TABLET 1 TABLET: TABLET at 09:45

## 2024-03-20 RX ADMIN — ACAMPROSATE CALCIUM 333 MG: 333 TABLET, DELAYED RELEASE ORAL at 20:27

## 2024-03-20 RX ADMIN — CHLORDIAZEPOXIDE HYDROCHLORIDE 25 MG: 25 CAPSULE ORAL at 01:19

## 2024-03-20 RX ADMIN — Medication 100 MG: at 09:45

## 2024-03-20 RX ADMIN — CHLORDIAZEPOXIDE HYDROCHLORIDE 25 MG: 25 CAPSULE ORAL at 12:14

## 2024-03-20 RX ADMIN — CHLORDIAZEPOXIDE HYDROCHLORIDE 25 MG: 25 CAPSULE ORAL at 09:45

## 2024-03-20 RX ADMIN — OXCARBAZEPINE 300 MG: 300 TABLET, FILM COATED ORAL at 12:14

## 2024-03-20 RX ADMIN — ACAMPROSATE CALCIUM 333 MG: 333 TABLET, DELAYED RELEASE ORAL at 14:08

## 2024-03-20 RX ADMIN — CHLORDIAZEPOXIDE HYDROCHLORIDE 25 MG: 25 CAPSULE ORAL at 20:27

## 2024-03-20 RX ADMIN — LISINOPRIL 10 MG: 10 TABLET ORAL at 09:45

## 2024-03-20 RX ADMIN — FOLIC ACID 1 MG: 1 TABLET ORAL at 09:45

## 2024-03-20 RX ADMIN — CHLORDIAZEPOXIDE HYDROCHLORIDE 25 MG: 25 CAPSULE ORAL at 16:18

## 2024-03-20 RX ADMIN — CHLORDIAZEPOXIDE HYDROCHLORIDE 25 MG: 25 CAPSULE ORAL at 05:21

## 2024-03-20 ASSESSMENT — PAIN SCALES - GENERAL
PAINLEVEL_OUTOF10: 0
PAINLEVEL_OUTOF10: 0

## 2024-03-20 NOTE — GROUP NOTE
Group Therapy Note    Date: 3/20/2024    Group Start Time: 1045  Group End Time: 1140  Group Topic: Psychoeducation    [facility]    Opal Wang, CTRS    Date: 3/20/2024  Type of Group: Psychoeducation    Wellness Binder Information  Module Name:  fostering positive social wellness.     Patient's Goal:  Patient will be able to id steps to increase ones social wellness to work towards a more balanced future.    Notes:  pleasant and engaged in group, able to share when prompted and willing to read in group activity.   Accepting of handout.     Status After Intervention:  Improved    Participation Level: Active Listener and Interactive    Participation Quality: Appropriate, Attentive, and Sharing      Speech:  normal      Thought Process/Content: Logical      Affective Functioning: Congruent      Mood: euthymic      Level of consciousness:  Alert, Oriented x4, and Attentive      Response to Learning: Able to verbalize/acknowledge new learning, Able to retain information, and Progressing to goal      Endings: None Reported    Modes of Intervention: Education, Support, Socialization, Exploration, and Activity      Discipline Responsible: Psychoeducational Specialist      Signature:  Opal Wang, CTRS

## 2024-03-20 NOTE — GROUP NOTE
Group Therapy Note    Date: 3/20/2024    Group Start Time: 1330  Group End Time: 1350  Group Topic: Psychoeducation    [facility]    Opal Wang, CTRS    Date: 3/20/2024  Type of Group: Recovery    Wellness Binder Information  Module Name:  peer recovery     Patient's Goal:  patient will be able to local resources and [facility] outpt services available to pts.   Notes:  appeared to be an active listener in group.     Status After Intervention:  Improved    Participation Level: Active Listener and Interactive    Participation Quality: Appropriate and Attentive      Speech:  normal      Thought Process/Content: Logical      Affective Functioning: Congruent      Mood: euthymic      Level of consciousness:  Alert, Oriented x4, and Attentive      Response to Learning: Able to verbalize/acknowledge new learning, Able to retain information, and Progressing to goal      Endings: None Reported    Modes of Intervention: Education, Support, and Socialization      Discipline Responsible: Psychoeducational Specialist      Signature:  Opal Wang, CTRS

## 2024-03-20 NOTE — GROUP NOTE
Shared goal for the day as to talk with the dr and make sure I get the proper meds.                                                                       Group Therapy Note    Date: 3/20/2024    Group Start Time: 0930  Group End Time: 0950  Group Topic: Community Meeting    [facility]    Oapl Wang, CTRS    Date: 3/20/2024    Module Name:  Community Meeting     Patient's Goal:  Patient will be able to id daily expectations, staffing assignments, and asked to set goals for the day.     Notes:  Pleasant and engaged, able to be an active listener, and willing to share goal for the day.     Status After Intervention:  Improved    Participation Level: Active Listener and Interactive    Participation Quality: Appropriate, Attentive, and Sharing      Speech:  normal      Thought Process/Content: Logical      Affective Functioning: Congruent      Mood: euthymic      Level of consciousness:  Alert, Oriented x4, and Attentive      Response to Learning: Able to verbalize/acknowledge new learning, Able to retain information, and Progressing to goal      Endings: None Reported    Modes of Intervention: Education, Support, Socialization, and Clarifying      Discipline Responsible: Psychoeducational Specialist      Signature:  Opal Wang CTRS

## 2024-03-20 NOTE — GROUP NOTE
Group Therapy Note    Date: 3/20/2024    Group Start Time: 1405  Group End Time: 1440  Group Topic: Cognitive Skills    [facility]    Areli Mosquera MSW, LSW        Group Therapy Note    Attendees: 10       Patient's Goal:   Pt will be able to identify different attachment styles and discuss ways to form a secure attachment.     Notes:  Pt was an active participant in group discussion.     Status After Intervention:  Improved    Participation Level: Active Listener and Interactive    Participation Quality: Appropriate, Attentive, Sharing, and Supportive      Speech:  normal      Thought Process/Content: Logical  Linear      Affective Functioning: Congruent      Mood: depressed      Level of consciousness:  Alert, Oriented x4, and Attentive      Response to Learning: Able to verbalize current knowledge/experience, Able to verbalize/acknowledge new learning, Able to retain information, Capable of insight, and Progressing to goal      Endings: None Reported    Modes of Intervention: Education, Support, Socialization, Exploration, Clarifying, and Problem-solving      Discipline Responsible: /Counselor      Signature:  MADY Powell LSW

## 2024-03-20 NOTE — PLAN OF CARE
Problem: Self Harm/Suicidality  Goal: Will have no self-injury during hospital stay  Description: INTERVENTIONS:  1.  Ensure constant observer at bedside with Q15M safety checks  2.  Maintain a safe environment  3.  Secure patient belongings  4.  Ensure family/visitors adhere to safety recommendations  5.  Ensure safety tray has been added to patient's diet order  6.  Every shift and PRN: Re-assess suicidal risk via Frequent Screener    3/20/2024 1333 by Romi Jones RN  Outcome: Progressing     Problem: Depression  Goal: Will be euthymic at discharge  Description: INTERVENTIONS:  1. Administer medication as ordered  2. Provide emotional support via 1:1 interaction with staff  3. Encourage involvement in milieu/groups/activities  4. Monitor for social isolation  3/20/2024 1333 by Romi Jones RN  Outcome: Progressing     Problem: Psychosis  Goal: Will report no hallucinations or delusions  Description: INTERVENTIONS:  1. Administer medication as  ordered  2. Assist with reality testing to support increasing orientation  3. Assess if patient's hallucinations or delusions are encouraging self harm or harm to others and intervene as appropriate  3/20/2024 1333 by Romi Jones RN  Outcome: Progressing     Problem: Behavior  Goal: Pt/Family maintain appropriate behavior and adhere to behavioral management agreement, if implemented  Description: INTERVENTIONS:  1. Assess patient/family's coping skills and  non-compliant behavior (including use of illegal substances)  2. Notify security of behavior or suspected illegal substances which indicate the need for search of the family and/or belongings  3. Encourage verbalization of thoughts and concerns in a socially appropriate manner  4. Utilize positive, consistent limit setting strategies supporting safety of patient, staff and others  5. Encourage participation in the decision making process about the behavioral management agreement  6. If a visitor's  behavior poses a threat to safety call refer to organization policy.  7. Initiate consult with , Psychosocial CNS, Spiritual Care as appropriate  3/20/2024 1333 by Romi Jones RN  Outcome: Progressing     Problem: Anxiety  Goal: Will report anxiety at manageable levels  Description: INTERVENTIONS:  1. Administer medication as ordered  2. Teach and rehearse alternative coping skills  3. Provide emotional support with 1:1 interaction with staff  Outcome: Progressing     Problem: Drug Abuse/Detox  Goal: Will have no detox symptoms and will verbalize plan for changing drug-related behavior  Description: INTERVENTIONS:  1. Administer medication as ordered  2. Monitor physical status  3. Provide emotional support with 1:1 interaction with staff  4. Encourage  recovery focused treatment   Outcome: Progressing     Problem: Involuntary Admit  Goal: Will cooperate with staff recommendations and doctor's orders and will demonstrate appropriate behavior  Description: INTERVENTIONS:  1. Treat underlying conditions and offer medication as ordered  2. Educate regarding involuntary admission procedures and rules  3. Contain excessive/inappropriate behavior per unit and hospital policies  Outcome: Progressing     Patient denies suicidal ideations, homicidal ideations, and hallucinations. Patient has poor insight into reason for admission. He is social with peers, attending groups, and is medication compliant. He is in control of his behavior.

## 2024-03-20 NOTE — H&P
review. Automated exposure control, iterative reconstruction, and/or weight based adjustment of the mA/kV was utilized to reduce the radiation dose to as low as reasonably achievable. COMPARISON: None. HISTORY: ORDERING SYSTEM PROVIDED HISTORY: epigastric pain and elevated lipase TECHNOLOGIST PROVIDED HISTORY: Reason for exam:->epigastric pain and elevated lipase Additional Contrast?->None FINDINGS: Lower Chest: Lung bases are grossly clear. Organs: Liver is homogeneous in appearance.  No stones seen in the gallbladder.  The pancreas is homogeneous in appearance.  No surrounding inflammation.  The spleen is unremarkable.  Both adrenal glands are within normal limits.  Symmetric appearance of the kidneys.  No stones or distension seen in the renal collecting system.  Small area of increased density seen on the left kidney suggesting either small proteinaceous or hemorrhagic cyst. There is cyst identified in the kidneys bilaterally. GI/Bowel: The stomach is distended with fluid.  No wall thickening.  Small bowel is mildly distended with fluid.  Findings to suggest a mild enteritis. No signs of obvious obstruction.  There is increased stool burden seen diffusely throughout the colon to suggest clinical presentation of constipation.  Stool and air identified in the rectal vault with mild distension.  No signs of obvious obstruction or significant wall thickening. Pelvis: The bladder is mildly distended.  Prostate is unremarkable. Peritoneum/Retroperitoneum: No abdominal retroperitoneal lymphadenopathy. Vascular calcifications seen in the abdominal aorta and iliac vessels.  No pelvic adenopathy.  No free fluid or free air.  No abnormal mass or fluid collections identified. Bones/Soft Tissues: The bony structures reveal degenerative changes seen within the spine and pelvis.  No ventral abdominal wall mass or defect.     1. Increased stool burden seen diffusely throughout the colon to suggest clinical presentation of  constipation. Stool and air identified in the rectal vault with mild distension. No signs of obvious obstruction or significant wall thickening. 2. The stomach is distended with fluid. No wall thickening. Small bowel is mildly distended with fluid. Findings to suggest a mild gastroenteritis.  No signs of obvious obstruction. 3. The pancreas is homogeneous in appearance. No surrounding inflammation. 4. No free fluid or free air. No abnormal mass or fluid collections identified.     XR CHEST PORTABLE    Result Date: 3/11/2024  EXAMINATION: ONE XRAY VIEW OF THE CHEST 3/11/2024 7:31 pm COMPARISON: None. HISTORY: ORDERING SYSTEM PROVIDED HISTORY: syncope TECHNOLOGIST PROVIDED HISTORY: Reason for exam:->syncope FINDINGS: The lungs are without acute focal process.  There is no effusion or pneumothorax. The cardiomediastinal silhouette is without acute process. The osseous structures are without acute process.     No acute process.     TREATMENT PLAN:  The patient's diagnosis, treatment plan, medication management were formulated after patient was seen directly by the attending physician and myself and all relevant documentation was reviewed.    Risk, benefit, side effects, possible outcomes of the medication and alternatives discussed with the patient and the patient demonstrated understanding.  The patient was also educated that the outcome of treatment will depend on the medication compliance as directed by the prescribers along with regular follow-up, compliance with the labs and other work-up, as clinically indicated.      The patient was referred to outpatient/inpatient substance abuse rehabilitation programming.  He was educated multiple times during the hospitalization that if he chooses to continue to use drugs or alcohol, he may potentially act out impulsively, resulting in serious harm to self or others, even though unintentional.  He was also educated that mental health treatment cannot be optimized with ongoing

## 2024-03-20 NOTE — BH NOTE
Patient denies suicidal ideation, homicidal ideations and AVH. Reports anxiety 7/10 and depression 4/10. Presents calm and cooperative during assessment.  Patient is out on the unit and is social with peers.  Medications taken without issue.  No complaints or concerns verbalized at this time.  No unit problems reported.  Will continue to observe and support.

## 2024-03-20 NOTE — PLAN OF CARE
Problem: Self Harm/Suicidality  Goal: Will have no self-injury during hospital stay  Description: INTERVENTIONS:  1.  Ensure constant observer at bedside with Q15M safety checks  2.  Maintain a safe environment  3.  Secure patient belongings  4.  Ensure family/visitors adhere to safety recommendations  5.  Ensure safety tray has been added to patient's diet order  6.  Every shift and PRN: Re-assess suicidal risk via Frequent Screener    3/19/2024 2358 by Aure Flaherty RN  Outcome: Progressing     Problem: Depression  Goal: Will be euthymic at discharge  Description: INTERVENTIONS:  1. Administer medication as ordered  2. Provide emotional support via 1:1 interaction with staff  3. Encourage involvement in milieu/groups/activities  4. Monitor for social isolation  3/19/2024 2358 by Aure Flaherty RN  Outcome: Progressing     Problem: Sima  Goal: Will exhibit normal sleep and speech and no impulsivity  Description: INTERVENTIONS:  1. Administer medication as ordered  2. Set limits on impulsive behavior  3. Make attempts to decrease external stimuli as possible  3/19/2024 2358 by Aure Flaherty RN  Outcome: Progressing     Problem: Psychosis  Goal: Will report no hallucinations or delusions  Description: INTERVENTIONS:  1. Administer medication as  ordered  2. Assist with reality testing to support increasing orientation  3. Assess if patient's hallucinations or delusions are encouraging self harm or harm to others and intervene as appropriate  3/19/2024 2358 by Aure Flaherty RN  Outcome: Progressing     Problem: Behavior  Goal: Pt/Family maintain appropriate behavior and adhere to behavioral management agreement, if implemented  Description: INTERVENTIONS:  1. Assess patient/family's coping skills and  non-compliant behavior (including use of illegal substances)  2. Notify security of behavior or suspected illegal substances which indicate the need for search of the family and/or belongings  3. Encourage

## 2024-03-20 NOTE — DISCHARGE INSTRUCTIONS
Follow up for Tobacco Cessation at:    [facility]                              Date:  Tuesday 3/26/24 at 3pm  [address] , [address]   [place], [place] [address]   Phone: [phone]   Fax: [phone]     Substance Use Treatment Facilities:    [facility]  [address] [place] [address]   Phone: [phone]   Fax: [phone]    [facility]  [address] [place] [address]   Phone: [phone]  Fax: [phone]     [facility]  [address] [place] [address]   Phone: [phone]   Fax: [phone]    [facility]  [address]  [address], [place] [address]   Phone: [phone]   Fax: [phone]    [facility]  [address], [place] [address]   Phone: [phone]   Fax: [phone]    [facility]  [address] [place] [address]   Phone: [phone]   Fax: [phone]    [facility]  [address] [place] [address]   Phone: [phone]  Fax: [phone]     [facility]   [address], [place] [address]   Phone: [phone]       [facility]  [address], [place] [address]   Phone: [phone]   Fax: [phone]    [facility]  [address] [place] [address]   Phone: [phone]   Fax: [phone]    [facility]  [address] [place] [address]   Phone: [phone]   Fax: [phone]    [facility]  [address] [place] [address] [place] [address]   Phone: [phone]   Fax: [phone]    [facility]  [address] [place] [address]   Phone: [phone]   Fax: [phone]    [facility]  [address] [place] [address]   Phone: [phone]   Fax:[phone]     [facility]  [address] [place] [address]   Phone: [phone]   Fax: [phone]     [facility] [address] [place] [address]   Phone: [phone]   Fax:    Phone:[phone]   Fax: [phone]     [facility]   [address] [place] [address]   Phone: [phone]       [facility]   [address] [place] [address]   Phone: [phone]   Fax: [phone]     [facility]   [address] [place] [address]   Phone: [phone]     Fax: [phone]     [facility]   [address] [facility] [address]   Phone:[phone]       [facility]   [address] [place] [address]   Phone: [phone]   Fax: [phone]     [facility]   [address] [place] [address]   Phone: [phone]   Fax: [phone]     [facility]   [address] [place] [address]   Phone: [phone]       [facility]   [address] [place] [address]   Phone: [phone]   Fax: [phone]     [facility]   [address] [facility] [address] [address]   Phone: [phone]   Fax: [phone]     [facility]   [address] [place] [address]    Phone: [phone]   Fax [phone]     [facility]   [address] [facility] [place] [address]   Phone: [phone]   Fax: [phone]      Patient was offered a referral to substance abuse treatment, patient declined referral at this time.

## 2024-03-20 NOTE — PROGRESS NOTES
[facility]  Initial Interdisciplinary Treatment Plan Note      Original treatment plan Date & Time: 03/20/2024 0900    Admission Type:       Reason for admission:   Reason for Admission: wanted to come in for detox and expressed that I have had SI thoughts in the past but am not currently    Estimated Length of Stay:  5-7days  Estimated Discharge Date: To be determined by physician.    PATIENT STRENGTHS:  Patient Strengths:   Patient Strengths and Limitations:Limitations: Inappropriate/potentially harmful leisure interests, Difficult relationships / poor social skills, Demonstrates discomfort with /lack of social skills, Difficulty problem solving/relies on others to help solve problems  Addictive Behavior: Addictive Behavior  In the Past 3 Months, Have You Felt or Has Someone Told You That You Have a Problem With  : None  Medical Problems:  Past Medical History:   Diagnosis Date    Depression     Hypertension      Status EXAM:Mental Status and Behavioral Exam  Normal: No (resting quietly in bed with eyes closed. Unable to assess.)  Level of Assistance: Independent/Self  Facial Expression: Flat  Affect: Appropriate  Level of Consciousness: Alert  Frequency of Checks: 4 times per hour, close  Mood:Normal: No  Mood: Anxious, Depressed  Motor Activity:Normal: Yes  Eye Contact: Fair  Observed Behavior: Cooperative, Friendly  Sexual Misconduct History: Current - no  Preception: [place] to person, [place] to time, [place] to place, [place] to situation  Attention:Normal: Yes  Thought Processes: Unremarkable  Thought Content:Normal: Yes  Depression Symptoms: Feelings of helplessness, Feelings of hopelessess, Loss of interest, Appetite change, Sleep disturbance  Anxiety Symptoms: Generalized  Sima Symptoms: No problems reported or observed.  Hallucinations: None  Delusions: No  Memory:Normal: Yes  Memory: Other (comment) (n/a)  Insight and Judgment: No  Insight and Judgment: Poor judgment, Poor

## 2024-03-20 NOTE — CARE COORDINATION
BRITTON met with pt to discuss discharge plan. He is found in [facility] speaking with peers. He states that he is doing better today, denied SI/HI/AVH. He states that he does not want to go to an inpatient substance abuse rehab at discharge, states that he would like to treat with an individual counselor in [facility]. He states that he does not want referred to [facility]. He states that he has a list of providers at home and plans to call and refer himself after discharge, states that he does not want SW to refer him as he has upcoming medical appointments that he wants to schedule around, but his calendar is at home. He denied questions/concerns for SW.      BRITTON contacted pt cousin Opal  (DANIEL signed) to gain collateral. She states that her main concern for pt is him following up with mental health treatment, taking his medications, and his relapsing. She states that he has not been compliant with medication in the past, has relapsed multiple times. She would like for pt to go to inpatient substance abuse rehab and is upset that pt is not currently agreeable to this. She states that she wants to continue to talk with pt about what his best options are. She understands that it is pt choice. She states that pt is able to return home with her when he does discharge. She denied pt access to guns/weapons.     BRITTON did include resources for mental health agencies and substance abuse rehabs in pt discharge summary.

## 2024-03-21 LAB
CHOLEST SERPL-MCNC: 144 MG/DL
HBA1C MFR BLD: 4.7 % (ref 4–5.6)
HDLC SERPL-MCNC: 69 MG/DL
LDLC SERPL CALC-MCNC: 67 MG/DL
TRIGL SERPL-MCNC: 40 MG/DL
VLDLC SERPL CALC-MCNC: 8 MG/DL

## 2024-03-21 PROCEDURE — 6370000000 HC RX 637 (ALT 250 FOR IP): Performed by: NURSE PRACTITIONER

## 2024-03-21 PROCEDURE — 6370000000 HC RX 637 (ALT 250 FOR IP): Performed by: PSYCHIATRY & NEUROLOGY

## 2024-03-21 PROCEDURE — 80061 LIPID PANEL: CPT

## 2024-03-21 PROCEDURE — 83036 HEMOGLOBIN GLYCOSYLATED A1C: CPT

## 2024-03-21 PROCEDURE — 36415 COLL VENOUS BLD VENIPUNCTURE: CPT

## 2024-03-21 PROCEDURE — 99232 SBSQ HOSP IP/OBS MODERATE 35: CPT | Performed by: NURSE PRACTITIONER

## 2024-03-21 PROCEDURE — 1240000000 HC EMOTIONAL WELLNESS R&B

## 2024-03-21 RX ORDER — ACAMPROSATE CALCIUM 333 MG/1
666 TABLET, DELAYED RELEASE ORAL 3 TIMES DAILY
Qty: 180 TABLET | Refills: 0 | Status: SHIPPED | OUTPATIENT
Start: 2024-03-21 | End: 2024-03-21 | Stop reason: HOSPADM

## 2024-03-21 RX ORDER — OXCARBAZEPINE 300 MG/1
300 TABLET, FILM COATED ORAL 2 TIMES DAILY
Qty: 60 TABLET | Refills: 0 | Status: SHIPPED | OUTPATIENT
Start: 2024-03-21 | End: 2024-04-20

## 2024-03-21 RX ORDER — CHLORDIAZEPOXIDE HYDROCHLORIDE 25 MG/1
25 CAPSULE, GELATIN COATED ORAL 3 TIMES DAILY PRN
Qty: 9 CAPSULE | Refills: 0 | Status: SHIPPED | OUTPATIENT
Start: 2024-03-21 | End: 2024-03-24

## 2024-03-21 RX ORDER — CHLORDIAZEPOXIDE HYDROCHLORIDE 25 MG/1
25 CAPSULE, GELATIN COATED ORAL 3 TIMES DAILY PRN
Status: DISCONTINUED | OUTPATIENT
Start: 2024-03-21 | End: 2024-03-22 | Stop reason: HOSPADM

## 2024-03-21 RX ORDER — ACAMPROSATE CALCIUM 333 MG/1
666 TABLET, DELAYED RELEASE ORAL 3 TIMES DAILY
Qty: 180 TABLET | Refills: 0 | Status: SHIPPED | OUTPATIENT
Start: 2024-03-21 | End: 2024-04-20

## 2024-03-21 RX ORDER — ACAMPROSATE CALCIUM 333 MG/1
666 TABLET, DELAYED RELEASE ORAL 3 TIMES DAILY
Status: DISCONTINUED | OUTPATIENT
Start: 2024-03-21 | End: 2024-03-22 | Stop reason: HOSPADM

## 2024-03-21 RX ADMIN — FOLIC ACID 1 MG: 1 TABLET ORAL at 09:18

## 2024-03-21 RX ADMIN — CHLORDIAZEPOXIDE HYDROCHLORIDE 25 MG: 25 CAPSULE ORAL at 04:26

## 2024-03-21 RX ADMIN — CHLORDIAZEPOXIDE HYDROCHLORIDE 25 MG: 25 CAPSULE ORAL at 21:58

## 2024-03-21 RX ADMIN — ACAMPROSATE CALCIUM 666 MG: 333 TABLET, DELAYED RELEASE ORAL at 13:46

## 2024-03-21 RX ADMIN — OXCARBAZEPINE 300 MG: 300 TABLET, FILM COATED ORAL at 09:19

## 2024-03-21 RX ADMIN — ACAMPROSATE CALCIUM 666 MG: 333 TABLET, DELAYED RELEASE ORAL at 21:55

## 2024-03-21 RX ADMIN — LISINOPRIL 10 MG: 10 TABLET ORAL at 09:19

## 2024-03-21 RX ADMIN — ACAMPROSATE CALCIUM 333 MG: 333 TABLET, DELAYED RELEASE ORAL at 09:18

## 2024-03-21 RX ADMIN — CHLORDIAZEPOXIDE HYDROCHLORIDE 25 MG: 25 CAPSULE ORAL at 00:32

## 2024-03-21 RX ADMIN — OXCARBAZEPINE 300 MG: 300 TABLET, FILM COATED ORAL at 21:55

## 2024-03-21 RX ADMIN — MULTIVITAMIN TABLET 1 TABLET: TABLET at 09:19

## 2024-03-21 RX ADMIN — Medication 100 MG: at 09:19

## 2024-03-21 ASSESSMENT — PAIN SCALES - GENERAL
PAINLEVEL_OUTOF10: 0
PAINLEVEL_OUTOF10: 0

## 2024-03-21 NOTE — DISCHARGE SUMMARY
DISCHARGE SUMMARY      Patient ID:  Jesus Briscoe  [id number]  58 y.o.  1965    Admit date: 3/18/2024    Discharge date and time: 3/21/2024    Admitting Physician: Katia Felix MD     Discharge Physician: Dr Isidro VEE    Discharge Diagnoses:   Patient Active Problem List   Diagnosis    JENNA (acute kidney injury) (HCC)    Cluster B personality traits (HCC)    Mood disorder (HCC)    Uncomplicated alcohol dependence (HCC)       Admission Condition: poor    Discharged Condition: stable    Admission Circumstance:   Jesus Briscoe is a 58 year old male - with a past psychiatric history of depression, anxiety, OCD, and alcohol abuse - who presents to the ED after his PCP asked him if he had thoughts/plans for suicide to which patient answered yes. In the ED, patient was intoxicated and said he was feeling suicidal to [facility] via EMS and placed on involuntary hold due to concern of suicidal ideation.  Precipitating factors include alcoholism and previous depressive symptoms that are untreated likely contributed to these events, unknown duration.       PAST MEDICAL/PSYCHIATRIC HISTORY:   Past Medical History:   Diagnosis Date    Depression     Hypertension        FAMILY/SOCIAL HISTORY:  No family history on file.  Social History     Socioeconomic History    Marital status: Legally      Spouse name: Not on file    Number of children: Not on file    Years of education: Not on file    Highest education level: Not on file   Occupational History    Not on file   Tobacco Use    Smoking status: Former     Current packs/day: 0.00     Average packs/day: 0.5 packs/day for 38.8 years (19.4 ttl pk-yrs)     Types: Cigarettes     Start date:      Quit date: 2023     Years since quittin.3    Smokeless tobacco: Never   Substance and Sexual Activity    Alcohol use: Not Currently    Drug use: Never    Sexual activity: Not on file   Other Topics Concern    Not on file   Social History Narrative     of depression.  Cognition:  oriented to person, place, and time   Concentration intact  Memory intact  Insight good   Judgement fair   Fund of Knowledge adequate      ASSESSMENT:  Patient symptoms are:  [x] Well controlled  [x] Improving  [] Worsening  [] No change    Reason for more than one antipsychotic:  [x] N/A  [] 3 Failed Monotherapy attempts (Drugs tried:)  [] Crossover to a new antipsychotic  [] Taper to Monotherapy from Polypharmacy  [] Augmentation of clozapine therapy due to treatment resistance to single therapy    Diagnosis:  Principal Problem:    Mood disorder (HCC)  Active Problems:    Cluster B personality traits (HCC)    Uncomplicated alcohol dependence (HCC)  Resolved Problems:    MDD (major depressive disorder), single episode    Mood disorder due to known physiological condition with mixed features      LABS:    Recent Labs     03/18/24  1557   WBC 6.5   HGB 12.4*        Recent Labs     03/18/24  1557      K 4.0   *   CO2 21*   BUN 13   CREATININE 0.8   GLUCOSE 85     Recent Labs     03/18/24  1557   BILITOT 0.3   ALKPHOS 61   AST 65*   *     Lab Results   Component Value Date/Time    BARBSCNU NEGATIVE 03/18/2024 03:57 PM    LABBENZ NEGATIVE 03/18/2024 03:57 PM    LABMETH NEGATIVE 03/18/2024 03:57 PM     Lab Results   Component Value Date/Time    TSH 1.11 03/12/2024 04:41 AM     No results found for: \"LITHIUM\"  No results found for: \"VALPROATE\", \"CBMZ\"    RISK ASSESSMENT AT DISCHARGE: Low risk for suicide and homicide.     Treatment Plan:  The patient's diagnosis, treatment plan, medication management were formulated after patient was seen directly by the attending physician and myself and all relevant documentation was reviewed.    Risk, benefit, side effects, possible outcomes of the medication and alternatives discussed with the patient and the patient demonstrated understanding.  The patient was also educated that the outcome of treatment will depend on the

## 2024-03-21 NOTE — GROUP NOTE
Group Therapy Note    Date: 3/21/2024    Group Start Time: 1000  Group End Time: 1015  Group Topic: Community Meeting    [facility]    Georgina Rivera, EVARISTO EARL        Group Therapy Note    Attendees: 15       Patient's Goal:  Pt attended community meeting where we discussed unit rules and expectations.      Notes:  Pt was an active participant in group and gave their daily goal as, \"rigor honesty to not deflect the root of me being here.\"     Status After Intervention:  Improved    Participation Level: Active Listener and Interactive    Participation Quality: Appropriate, Attentive, and Sharing      Speech:  normal      Thought Process/Content: Logical      Affective Functioning: Congruent      Mood: euthymic      Level of consciousness:  Alert, Oriented x4, and Attentive      Response to Learning: Able to verbalize current knowledge/experience, Able to verbalize/acknowledge new learning, Able to retain information, and Capable of insight      Endings: None Reported    Modes of Intervention: Education, Support, and Clarifying      Discipline Responsible: /Counselor      Signature:  MADY Allison LSW

## 2024-03-21 NOTE — GROUP NOTE
Group Therapy Note    Date: 3/21/2024    Group Start Time: 1300  Group End Time: 1330  Group Topic: Cognitive Skills    [facility]    Georgina Rivera, MADY, EVARISTO        Group Therapy Note    Attendees: 8       Patient's Goal:  Pt attended group where we discussed personal boundaries, the different types of boundaries and different depths of boundaries. Also we discussed what is healthy and what is inappropriate.    Notes:  Pt was an active participant in group therapy.     Status After Intervention:  Improved    Participation Level: Active Listener and Interactive    Participation Quality: Appropriate, Attentive, and Sharing      Speech:  normal      Thought Process/Content: Logical      Affective Functioning: Congruent      Mood: euthymic      Level of consciousness:  Alert, Oriented x4, and Attentive      Response to Learning: Able to verbalize current knowledge/experience, Able to verbalize/acknowledge new learning, Able to retain information, and Capable of insight      Endings: None Reported    Modes of Intervention: Education, Support, Socialization, Exploration, Clarifying, and Problem-solving      Discipline Responsible: /Counselor      Signature:  Vivianful MADY Rivera, EVARISTO

## 2024-03-21 NOTE — GROUP NOTE
Group Therapy Note    Date: 3/21/2024    Group Start Time: 1410  Group End Time: 1440  Group Topic: Cognitive Skills    [facility]    Areli Mosquera MSW, LSW        Group Therapy Note    Attendees: 10       Patient's Goal:  Pt will be able to discuss tips for self care and complete self care assessment to identify self care needs/goals.     Notes:  Pt was an active participant in group discussion.     Status After Intervention:  Improved    Participation Level: Active Listener and Interactive    Participation Quality: Appropriate, Attentive, Sharing, and Supportive      Speech:  normal      Thought Process/Content: Logical  Linear      Affective Functioning: Congruent      Mood: euthymic      Level of consciousness:  Alert, Oriented x4, and Attentive      Response to Learning: Able to verbalize current knowledge/experience, Able to verbalize/acknowledge new learning, Able to retain information, Capable of insight, and Progressing to goal      Endings: None Reported    Modes of Intervention: Education, Support, Socialization, Exploration, Clarifying, and Problem-solving      Discipline Responsible: /Counselor      Signature:  MADY Powell LSW

## 2024-03-21 NOTE — PLAN OF CARE
Problem: Depression  Goal: Will be euthymic at discharge  Description: INTERVENTIONS:  1. Administer medication as ordered  2. Provide emotional support via 1:1 interaction with staff  3. Encourage involvement in milieu/groups/activities  4. Monitor for social isolation  3/21/2024 1000 by Bobbi Vargas, RN  Outcome: Progressing  3/20/2024 2234 by James Amato RN  Outcome: Progressing     Problem: Behavior  Goal: Pt/Family maintain appropriate behavior and adhere to behavioral management agreement, if implemented  Description: INTERVENTIONS:  1. Assess patient/family's coping skills and  non-compliant behavior (including use of illegal substances)  2. Notify security of behavior or suspected illegal substances which indicate the need for search of the family and/or belongings  3. Encourage verbalization of thoughts and concerns in a socially appropriate manner  4. Utilize positive, consistent limit setting strategies supporting safety of patient, staff and others  5. Encourage participation in the decision making process about the behavioral management agreement  6. If a visitor's behavior poses a threat to safety call refer to organization policy.  7. Initiate consult with , Psychosocial CNS, Spiritual Care as appropriate  Outcome: Progressing     Problem: Anxiety  Goal: Will report anxiety at manageable levels  Description: INTERVENTIONS:  1. Administer medication as ordered  2. Teach and rehearse alternative coping skills  3. Provide emotional support with 1:1 interaction with staff  Outcome: Progressing

## 2024-03-21 NOTE — PLAN OF CARE
Problem: Self Harm/Suicidality  Goal: Will have no self-injury during hospital stay  Description: INTERVENTIONS:  1.  Ensure constant observer at bedside with Q15M safety checks  2.  Maintain a safe environment  3.  Secure patient belongings  4.  Ensure family/visitors adhere to safety recommendations  5.  Ensure safety tray has been added to patient's diet order  6.  Every shift and PRN: Re-assess suicidal risk via Frequent Screener    3/20/2024 2234 by James Amato RN  Outcome: Progressing     Problem: Depression  Goal: Will be euthymic at discharge  Description: INTERVENTIONS:  1. Administer medication as ordered  2. Provide emotional support via 1:1 interaction with staff  3. Encourage involvement in milieu/groups/activities  4. Monitor for social isolation  3/20/2024 2234 by James Amato RN  Outcome: Progressing     Problem: Sima  Goal: Will exhibit normal sleep and speech and no impulsivity  Description: INTERVENTIONS:  1. Administer medication as ordered  2. Set limits on impulsive behavior  3. Make attempts to decrease external stimuli as possible  Outcome: Progressing     Patient out in day room watching television with other patients. Patient appears worried, friendly, and social. States, \"I'm doing great. Better if we could get this television to work right.\" Denies any suicidal or homicidal ideations. Denies any auditory or visual hallucinations. Encouraged patient to let staff know should he have any questions or concerns. Verbalized understanding. Will continue to monitor.

## 2024-03-21 NOTE — CARE COORDINATION
SW contacted [facility] [phone] to discuss pt referral. They states that they have not received pt referral, but do report that they have beds available.  SW confirmed fax number of . SW faxed referral again.

## 2024-03-21 NOTE — GROUP NOTE
Group Therapy Note    Date: 3/21/2024    Group Start Time: 1015  Group End Time: 1050  Group Topic: Cognitive Skills    [facility]    Georgina Rivera, EVARISTO EARL        Group Therapy Note    Attendees: 15       Patient's Goal:  Pt attended group where we learned about cognitive distortions.    Notes:  Pt was an active participant in group therapy.    Status After Intervention:  Improved    Participation Level: Active Listener and Interactive    Participation Quality: Appropriate, Attentive, Sharing, and Supportive      Speech:  normal      Thought Process/Content: Logical      Affective Functioning: Congruent      Mood: euthymic      Level of consciousness:  Alert, Oriented x4, and Attentive      Response to Learning: Able to verbalize current knowledge/experience, Able to verbalize/acknowledge new learning, Able to retain information, and Capable of insight      Endings: None Reported    Modes of Intervention: Education, Support, Socialization, Exploration, Clarifying, and Problem-solving      Discipline Responsible: /Counselor      Signature:  MADY Allison, EVARISTO

## 2024-03-21 NOTE — PROGRESS NOTES
CLINICAL PHARMACY NOTE: MEDS TO BEDS    Total # of Prescriptions Filled: 3   The following medications were delivered to the patient:  Oxcarbazepine 300mg  Acamprosate calcium 333mg  Chlordiapoxide 25mg    Additional Documentation:  Delivered to ERIC RN 3-21-24

## 2024-03-21 NOTE — CARE COORDINATION
BRITTON met with pt to discuss discharge plan. He states that he has spoken with his cousin and thought about it more, states that he would like to go to inpatient rehab for substance abuse at discharge. He denied SI/HI/AVH. He would [facility] to [facility], but if these facilities do not accept for any reason, then he would like to go to [facility] as it is located [facility]. Pt denied questions or concerns for SW, states that he is hopeful to go to inpatient substance abuse rehab soon in order to continue treatment and future recovery.     BRITTON faxed referrals to [facility].

## 2024-03-21 NOTE — CARE COORDINATION
Referral successfully send to [facility]. SW team following, awaiting acceptance from an inpatient substance abuse facility.     MADY Crowley, JESSIE-S  Behavioral Health

## 2024-03-21 NOTE — CARE COORDINATION
Fax machine is not working.     SW contacted [facility] [phone] who states that their email for referrals is [email or web address]. The email for [facility] admissions is [email or web address].     They will be looking for pt referral.

## 2024-03-22 VITALS
SYSTOLIC BLOOD PRESSURE: 141 MMHG | TEMPERATURE: 97.2 F | DIASTOLIC BLOOD PRESSURE: 94 MMHG | RESPIRATION RATE: 16 BRPM | HEART RATE: 64 BPM | OXYGEN SATURATION: 100 %

## 2024-03-22 PROCEDURE — 6370000000 HC RX 637 (ALT 250 FOR IP): Performed by: PSYCHIATRY & NEUROLOGY

## 2024-03-22 PROCEDURE — 99239 HOSP IP/OBS DSCHRG MGMT >30: CPT | Performed by: NURSE PRACTITIONER

## 2024-03-22 PROCEDURE — 6370000000 HC RX 637 (ALT 250 FOR IP): Performed by: NURSE PRACTITIONER

## 2024-03-22 RX ADMIN — FOLIC ACID 1 MG: 1 TABLET ORAL at 09:25

## 2024-03-22 RX ADMIN — Medication 100 MG: at 09:25

## 2024-03-22 RX ADMIN — LISINOPRIL 10 MG: 10 TABLET ORAL at 09:25

## 2024-03-22 RX ADMIN — MULTIVITAMIN TABLET 1 TABLET: TABLET at 09:25

## 2024-03-22 RX ADMIN — ACAMPROSATE CALCIUM 666 MG: 333 TABLET, DELAYED RELEASE ORAL at 09:24

## 2024-03-22 RX ADMIN — OXCARBAZEPINE 300 MG: 300 TABLET, FILM COATED ORAL at 09:25

## 2024-03-22 ASSESSMENT — PAIN SCALES - GENERAL: PAINLEVEL_OUTOF10: 0

## 2024-03-22 NOTE — CARE COORDINATION
BRITTON contacted [facility] [phone] to discuss pt referral. Iftikhar reported pt is accepted to [facility] for residential treatment but will need transportation at this time. Iftikhar reported pt will need meds in hand or scripts for his medication.      BRITTON met with pt to discuss discharge. Pt is alert and oriented x4. Pt's thought process was linear, Pt's mood is pleasant with a congruent affect. Pt's speech was clear, eye contact was good and pt had good insight/judgement. Pt denied SI, HI, and Hallucinations. Pt reported feeling good and ready to discharge. Pt is agreeable for substance use rehab. Pt reported he is able to pay for an Uber to go to [facility]. Pt will continue to follow-up with [facility].    BRITTON called pt's cousin Opal  (DANIEL signed) to discuss discharge. Opal reported she feels pt will do good in rehab, but does not feel like it is a good idea for the pt to take a taxi to [facility]. Opal reported she would feel better if she was able to transport him. Pt was agreeable for Opal to come and transport him. BRITTON gave Opal the address for [facility] in [place] and informed her to call the nurses station when she arrives. Opal reported no other concerns for BRITTON at this time.

## 2024-03-22 NOTE — BH NOTE
[facility]  Discharge Note    Pt discharged with followings belongings:   Dental Appliances: None  Vision - Corrective Lenses: None  Hearing Aid: None  Jewelry: None  Body Piercings Removed: No  Clothing: Jacket/Coat, Footwear, Gloves, Socks, Pants, Shirt, Other (Comment), Belt (! pr brown boots, 1 brown belt, 1 pr leather gloves, ! gray hoodie with strings, 1 pr balck jeans, 1 blue shirt, 1 pr black socks, 1 pr blue socks.)  Other Valuables: Wallet (Put in locker)   Valuables sent home with yes or returned to patient. Patient educated on aftercare instructions: yes  Information faxed at 11:23 AM .Patient verbalize understanding of AVS:  yes.    Status EXAM upon discharge:  Mental Status and Behavioral Exam  Normal: No  Level of Assistance: Independent/Self  Facial Expression: Brightened  Affect: Congruent  Level of Consciousness: Alert  Frequency of Checks: 4 times per hour, close  Mood:Normal: No  Mood: Anxious  Motor Activity:Normal: Yes  Eye Contact: Good  Observed Behavior: Cooperative  Sexual Misconduct History: Current - no  Preception: [place] to person, [place] to time, [place] to place, [place] to situation  Attention:Normal: Yes  Thought Processes: Unremarkable  Thought Content:Normal: Yes  Depression Symptoms: No problems reported or observed.  Anxiety Symptoms: Generalized  Sima Symptoms: No problems reported or observed.  Hallucinations: None  Delusions: No  Memory:Normal: No  Memory: Other (comment) (n/a)  Insight and Judgment: No  Insight and Judgment: Poor judgment    Tobacco Screening:  Practical Counseling, on admission, romulo X, if applicable and completed (first 3 are required if patient doesn't refuse)yes:            ( ) Recognizing danger situations (included triggers and roadblocks)                    ( ) Coping skills (new ways to manage stress,relaxation techniques, changing routine, distraction)                                                           ( ) Basic information

## 2024-03-22 NOTE — PLAN OF CARE
Problem: Depression  Goal: Will be euthymic at discharge  Description: INTERVENTIONS:  1. Administer medication as ordered  2. Provide emotional support via 1:1 interaction with staff  3. Encourage involvement in milieu/groups/activities  4. Monitor for social isolation  3/22/2024 1045 by Bobbi Vargas RN  Outcome: Progressing  3/21/2024 2202 by Taty Fair RN  Outcome: Progressing     Problem: Behavior  Goal: Pt/Family maintain appropriate behavior and adhere to behavioral management agreement, if implemented  Description: INTERVENTIONS:  1. Assess patient/family's coping skills and  non-compliant behavior (including use of illegal substances)  2. Notify security of behavior or suspected illegal substances which indicate the need for search of the family and/or belongings  3. Encourage verbalization of thoughts and concerns in a socially appropriate manner  4. Utilize positive, consistent limit setting strategies supporting safety of patient, staff and others  5. Encourage participation in the decision making process about the behavioral management agreement  6. If a visitor's behavior poses a threat to safety call refer to organization policy.  7. Initiate consult with , Psychosocial CNS, Spiritual Care as appropriate  Outcome: Progressing     Problem: Anxiety  Goal: Will report anxiety at manageable levels  Description: INTERVENTIONS:  1. Administer medication as ordered  2. Teach and rehearse alternative coping skills  3. Provide emotional support with 1:1 interaction with staff  3/22/2024 1045 by Bobbi Vargas RN  Outcome: Progressing  3/21/2024 2202 by Taty Fair RN  Outcome: Progressing

## 2024-03-22 NOTE — PROGRESS NOTES
Patient resting quietly in bed with eyes closed. Respirations even and unlabored with no signs of distress observed. Environmental rounds continued.

## 2024-03-22 NOTE — PLAN OF CARE
Problem: Depression  Goal: Will be euthymic at discharge  Description: INTERVENTIONS:  1. Administer medication as ordered  2. Provide emotional support via 1:1 interaction with staff  3. Encourage involvement in milieu/groups/activities  4. Monitor for social isolation  3/21/2024 2202 by Taty Fair, RN  Outcome: Progressing     Problem: Anxiety  Goal: Will report anxiety at manageable levels  Description: INTERVENTIONS:  1. Administer medication as ordered  2. Teach and rehearse alternative coping skills  3. Provide emotional support with 1:1 interaction with staff  3/21/2024 2202 by Taty Fair, RN  Outcome: Progressing     Patient has been out on the unit playing games with peers. Pleasant and cooperative during assessment. /100 via Dinamap and 146/96 manually, also voicing mild anxiety-prn Librium 25mg given at this time for CIWA of 1. Denies suicidal/homicidal ideations and hallucinations. Purposeful rounding continued.

## 2024-03-22 NOTE — GROUP NOTE
Shared goal for the day as to make it to treatment.                                                                      Group Therapy Note    Date: 3/22/2024    Group Start Time: 0905  Group End Time: 0925  Group Topic: Community Meeting    [facility]    Opal Wang, CTRS    Date: 3/22/2024  Type of Group: Community Meeting    Patient's Goal:  Patient will be able to id daily expectations, staffing assignments, and rules of the floor.    Notes:  Pleasant and engaged in group, appeared to be an active listener, and able to share a goal.     Status After Intervention:  Improved    Participation Level: Active Listener and Interactive    Participation Quality: Appropriate, Attentive, and Sharing      Speech:  normal      Thought Process/Content: Logical      Affective Functioning: Congruent      Mood: euthymic      Level of consciousness:  Alert, Oriented x4, and Attentive      Response to Learning: Able to verbalize/acknowledge new learning and Able to retain information      Endings: None Reported    Modes of Intervention: Education, Support, Socialization, and Problem-solving      Discipline Responsible: Psychoeducational Specialist      Signature:  Opal Wang, CTRS

## 2024-03-22 NOTE — TRANSITION OF CARE
Behavioral Health Transition Record to Provider    Patient Name: Jesus Briscoe  YOB: 1965   Medical Record Number: [id number]  Date of Admission: 3/18/2024  3:14 PM   Date of Discharge: 3/22/2024    Attending Provider: Katia Felix MD   Discharging Provider: Isidro  To contact this individual call [phone] and ask the  to page.  If unavailable, ask to be transferred to Behavioral Health Provider on call.  A Behavioral Health Provider will be available on call 24/7 and during holidays.    Primary Care Provider: Tyree Ellison DO    No Known Allergies    Reason for Admission: Jesus Briscoe is a 58 year old male - with a past psychiatric history of depression, anxiety, OCD, and alcohol abuse - who presents to the ED after his PCP asked him if he had thoughts/plans for suicide to which patient answered yes. In the ED, patient was intoxicated and said he was feeling suicidal to [facility] ED via EMS and placed on involuntary hold due to concern of suicidal ideation.  Precipitating factors include alcoholism and previous depressive symptoms that are untreated likely contributed to these events, unknown duration     Admission Diagnosis: Alcohol abuse [F10.10]  Mood disorder (HCC) [F39]  MDD (major depressive disorder), single episode [F32.9]    * No surgery found *    Results for orders placed or performed during the hospital encounter of 03/18/24   URINE DRUG SCREEN   Result Value Ref Range    Amphetamine Screen, Ur NEGATIVE NEGATIVE    Barbiturate Screen, Ur NEGATIVE NEGATIVE    Benzodiazepine Screen, Urine NEGATIVE NEGATIVE    Cocaine Metabolite, Urine NEGATIVE NEGATIVE    Methadone Screen, Urine NEGATIVE NEGATIVE    Opiates, Urine NEGATIVE NEGATIVE    Phencyclidine, Urine NEGATIVE NEGATIVE    Cannabinoid Scrn, Ur NEGATIVE NEGATIVE    Oxycodone Screen, Ur NEGATIVE NEGATIVE    Fentanyl, Ur NEGATIVE NEGATIVE    Buprenorphine Urine NEGATIVE NEGATIVE    Test Information

## 2024-03-22 NOTE — DISCHARGE SUMMARY
DISCHARGE SUMMARY      Patient ID:  Jesus Briscoe  [id number]  58 y.o.  1965    Admit date: 3/18/2024    Discharge date and time: 3/22/2024    Admitting Physician: Katia Felix MD     Discharge Physician: Dr Isidro VEE    Discharge Diagnoses:   Patient Active Problem List   Diagnosis    JENNA (acute kidney injury) (HCC)    Cluster B personality traits (HCC)    Mood disorder (HCC)    Uncomplicated alcohol dependence (HCC)       Admission Condition: poor    Discharged Condition: stable    Admission Circumstance:   Jesus Briscoe is a 58 year old male with a past psychiatric history of depression, anxiety, OCD, and alcohol abuse who presents to the ED after his PCP asked him if he had thoughts/plans for suicide to which patient answered yes.  He was transferred to [facility] emergency department via EMS, and admitted to the emergency room doctor that he was feeling suicidal.  He was placed on an involuntary hold by the emergency room doctor.  Precipitating factors include alcoholism and previous depressive symptoms that are untreated likely contributed to these events, unknown duration.       PAST MEDICAL/PSYCHIATRIC HISTORY:   Past Medical History:   Diagnosis Date    Depression     Hypertension        FAMILY/SOCIAL HISTORY:  No family history on file.  Social History     Socioeconomic History    Marital status: Legally      Spouse name: Not on file    Number of children: Not on file    Years of education: Not on file    Highest education level: Not on file   Occupational History    Not on file   Tobacco Use    Smoking status: Former     Current packs/day: 0.00     Average packs/day: 0.5 packs/day for 38.8 years (19.4 ttl pk-yrs)     Types: Cigarettes     Start date:      Quit date: 2023     Years since quittin.3    Smokeless tobacco: Never   Substance and Sexual Activity    Alcohol use: Not Currently    Drug use: Never    Sexual activity: Not on file   Other Topics Concern    Not